# Patient Record
Sex: MALE | Race: BLACK OR AFRICAN AMERICAN | Employment: FULL TIME | URBAN - METROPOLITAN AREA
[De-identification: names, ages, dates, MRNs, and addresses within clinical notes are randomized per-mention and may not be internally consistent; named-entity substitution may affect disease eponyms.]

---

## 2018-02-21 ENCOUNTER — OFFICE VISIT (OUTPATIENT)
Dept: SURGERY | Age: 48
End: 2018-02-21

## 2018-02-21 VITALS
SYSTOLIC BLOOD PRESSURE: 112 MMHG | RESPIRATION RATE: 18 BRPM | BODY MASS INDEX: 26.15 KG/M2 | TEMPERATURE: 97.6 F | DIASTOLIC BLOOD PRESSURE: 88 MMHG | HEART RATE: 61 BPM | WEIGHT: 162 LBS

## 2018-02-21 DIAGNOSIS — K40.90 LEFT INGUINAL HERNIA: ICD-10-CM

## 2018-02-21 DIAGNOSIS — K40.90 LEFT INGUINAL HERNIA: Primary | ICD-10-CM

## 2018-02-21 RX ORDER — BISMUTH SUBSALICYLATE 262 MG
1 TABLET,CHEWABLE ORAL DAILY
COMMUNITY

## 2018-02-21 RX ORDER — SODIUM CHLORIDE 0.9 % (FLUSH) 0.9 %
5-10 SYRINGE (ML) INJECTION EVERY 8 HOURS
Status: CANCELLED | OUTPATIENT
Start: 2018-02-21

## 2018-02-21 RX ORDER — SODIUM CHLORIDE 0.9 % (FLUSH) 0.9 %
5-10 SYRINGE (ML) INJECTION AS NEEDED
Status: CANCELLED | OUTPATIENT
Start: 2018-02-21

## 2018-02-21 NOTE — PROGRESS NOTES
[facility]  General Surgery    Subjective:      HPI: Patient is a very pleasant 50-year-old male with a past medical history is remarkable for kidney stones. He is referred by Dr. Benja Munoz for evaluation and management of left inguinal hernia. The patient states that he first noticed the pain in his left groin approximately 2 years ago. Since that time the hernia has grown to now occupied most of his scrotum. He has never had a hernia in the past.  He has not had abdominal pelvic surgery in the past.  He is self-employed in the [address] for [employer] and other [employer]. 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 He will be between jobs for March. He would like to have the procedure done soon. He denies any difficulty with defecation or urination. There are no active problems to display for this patient. Past Medical History:   Diagnosis Date    Kidney stone       History reviewed. No pertinent surgical history. Family History   Problem Relation Age of Onset    Diabetes Mother       Social History   Substance Use Topics    Smoking status: Never Smoker    Smokeless tobacco: Never Used    Alcohol use No      No Known Allergies    Prior to Admission medications    Medication Sig Start Date End Date Taking? Authorizing Provider   multivitamin (ONE A DAY) tablet Take 1 Tab by mouth daily. Yes Historical Provider   promethazine (PHENERGAN) 25 mg tablet Take 25 mg by mouth every six (6) hours as needed for Nausea. Historical Provider   tamsulosin (FLOMAX) 0.4 mg capsule Take 0.4 mg by mouth daily. Historical Provider   oxyCODONE-acetaminophen (PERCOCET) 5-325 mg per tablet Take 1 Tab by mouth every four (4) hours as needed for Pain. 2/14/14   Ru Patel NP       Review of Systems:    14 systems were reviewed. The results are as above in the HPI and otherwise negative.      Objective:     Vitals: 02/21/18 0905   BP: 112/88   Pulse: 61   Resp: 18   Temp: 97.6 °F (36.4 °C)   Weight: 73.5 kg (162 lb)       Physical Exam:  GENERAL: alert, cooperative, no distress, appears stated age,   EYE: conjunctivae/corneas clear. PERRL, EOM's intact. THROAT & NECK: normal and no erythema or exudates noted. ,    LYMPHATIC: Cervical, supraclavicular, and axillary nodes normal. ,   LUNG: clear to auscultation bilaterally,   HEART: regular rate and rhythm, S1, S2 normal, no murmur, click, rub or gallop,   ABDOMEN: soft, non-tender. Bowel sounds normal. No masses,  no organomegaly, large left inguinal hernia which is chronically incarcerated in the scrotum partially reducible. No overlying skin changes. Minimal pain with attempts to manually reduce the hernia with the patient supine. EXTREMITIES:  extremities normal, atraumatic, no cyanosis or edema,   SKIN: Normal.,   NEUROLOGIC: AOx3. Cranial nerves 2-12 and sensation grossly intact. ,     Data Review:  to be done    Mr. Case Smith has a reminder for a \"due or due soon\" health maintenance. I have asked that he contact his primary care provider for follow-up on this health maintenance. Impression:     · Patient with a large, symptomatic left inguinal hernia.     Plan:     · Open left inguinal hernia repair with mesh  · Consent on chart  · Preoperative orders written    Signed By: Liana Cronin MD     February 21, 2018

## 2018-02-23 RX ORDER — OXYCODONE AND ACETAMINOPHEN 5; 325 MG/1; MG/1
TABLET ORAL
Status: ON HOLD | COMMUNITY
End: 2018-03-02 | Stop reason: CLARIF

## 2018-02-27 ENCOUNTER — HOSPITAL ENCOUNTER (OUTPATIENT)
Dept: LAB | Age: 48
Discharge: HOME | End: 2018-02-27

## 2018-02-27 ENCOUNTER — HOSPITAL ENCOUNTER (OUTPATIENT)
Dept: GENERAL RADIOLOGY | Age: 48
Discharge: HOME | End: 2018-02-27
Payer: COMMERCIAL

## 2018-02-27 DIAGNOSIS — K40.90 LEFT INGUINAL HERNIA: ICD-10-CM

## 2018-02-27 LAB
ABSOLUTE LYMPHOCYTE COUNT, 10803: 1.7 K/UL (ref 1–4.8)
ANION GAP SERPL CALC-SCNC: 13 MMOL/L
ATRIAL RATE: 62 BPM
BASOPHILS # BLD: 0 K/UL (ref 0–0.2)
BASOPHILS NFR BLD: 0 % (ref 0–2)
BUN SERPL-MCNC: 12 MG/DL (ref 6–22)
CALCIUM SERPL-MCNC: 9.1 MG/DL (ref 8.4–10.4)
CALCULATED P AXIS, ECG09: 63 DEGREES
CALCULATED R AXIS, ECG10: 64 DEGREES
CALCULATED T AXIS, ECG11: 44 DEGREES
CHLORIDE SERPL-SCNC: 101 MMOL/L (ref 98–110)
CO2 SERPL-SCNC: 27 MMOL/L (ref 20–32)
CREAT SERPL-MCNC: 1.1 MG/DL (ref 0.5–1.2)
DIAGNOSIS, 93000: NORMAL
EOSINOPHIL # BLD: 0.2 K/UL (ref 0–0.5)
EOSINOPHIL NFR BLD: 4 % (ref 0–6)
ERYTHROCYTE [DISTWIDTH] IN BLOOD BY AUTOMATED COUNT: 13.1 % (ref 10–16)
GFRAA, 66117: >60
GFRNA, 66118: >60
GLUCOSE SERPL-MCNC: 117 MG/DL (ref 70–99)
GRANULOCYTES,GRANS: 44 % (ref 40–75)
HCT VFR BLD AUTO: 44.4 % (ref 39.3–51.6)
HGB BLD-MCNC: 14.8 G/DL (ref 13.1–17.2)
LYMPHOCYTES, LYMLT: 40 % (ref 27–45)
MCH RBC QN AUTO: 30 PG (ref 26–34)
MCHC RBC AUTO-ENTMCNC: 33 G/DL (ref 32–36)
MCV RBC AUTO: 89 FL (ref 80–95)
MONOCYTES # BLD: 0.5 K/UL (ref 0.1–0.9)
MONOCYTES NFR BLD: 12 % (ref 3–9)
NEUTROPHILS # BLD AUTO: 1.9 K/UL (ref 1.8–7.7)
P-R INTERVAL, ECG05: 134 MS
PLATELET # BLD AUTO: 230 K/UL (ref 140–440)
PMV BLD AUTO: 10.3 FL (ref 6–10.8)
POTASSIUM SERPL-SCNC: 4 MMOL/L (ref 3.5–5.5)
Q-T INTERVAL, ECG07: 416 MS
QRS DURATION, ECG06: 88 MS
QTC CALCULATION (BEZET), ECG08: 422 MS
RBC # BLD AUTO: 4.98 M/UL (ref 3.8–5.8)
SENTARA SPECIMEN COL,SENBCF: NORMAL
SODIUM SERPL-SCNC: 141 MMOL/L (ref 133–145)
VENTRICULAR RATE, ECG03: 62 BPM
WBC # BLD AUTO: 4.3 K/UL (ref 4–11)

## 2018-02-27 PROCEDURE — 93005 ELECTROCARDIOGRAM TRACING: CPT

## 2018-02-27 PROCEDURE — 99001 SPECIMEN HANDLING PT-LAB: CPT | Performed by: SURGERY

## 2018-02-27 PROCEDURE — 71046 X-RAY EXAM CHEST 2 VIEWS: CPT

## 2018-03-01 ENCOUNTER — ANESTHESIA EVENT (OUTPATIENT)
Dept: SURGERY | Age: 48
End: 2018-03-01
Payer: COMMERCIAL

## 2018-03-02 ENCOUNTER — HOSPITAL ENCOUNTER (OUTPATIENT)
Age: 48
Setting detail: OUTPATIENT SURGERY
Discharge: HOME | End: 2018-03-02
Attending: SURGERY | Admitting: SURGERY
Payer: COMMERCIAL

## 2018-03-02 ENCOUNTER — ANESTHESIA (OUTPATIENT)
Dept: SURGERY | Age: 48
End: 2018-03-02
Payer: COMMERCIAL

## 2018-03-02 VITALS
OXYGEN SATURATION: 97 % | WEIGHT: 171.4 LBS | TEMPERATURE: 97.5 F | HEART RATE: 79 BPM | HEIGHT: 66 IN | SYSTOLIC BLOOD PRESSURE: 109 MMHG | DIASTOLIC BLOOD PRESSURE: 71 MMHG | BODY MASS INDEX: 27.55 KG/M2 | RESPIRATION RATE: 14 BRPM

## 2018-03-02 DIAGNOSIS — K40.90 LEFT INGUINAL HERNIA: ICD-10-CM

## 2018-03-02 DIAGNOSIS — G89.18 POSTOPERATIVE PAIN: Primary | ICD-10-CM

## 2018-03-02 PROCEDURE — 77030010507 HC ADH SKN DERMBND J&J -B: Performed by: SURGERY

## 2018-03-02 PROCEDURE — 74011000250 HC RX REV CODE- 250: Performed by: SURGERY

## 2018-03-02 PROCEDURE — 74011000250 HC RX REV CODE- 250

## 2018-03-02 PROCEDURE — 77030002996 HC SUT SLK J&J -A: Performed by: SURGERY

## 2018-03-02 PROCEDURE — 77030034850: Performed by: SURGERY

## 2018-03-02 PROCEDURE — 77030020782 HC GWN BAIR PAWS FLX 3M -B: Performed by: SURGERY

## 2018-03-02 PROCEDURE — 76210000022 HC REC RM PH II 1.5 TO 2 HR: Performed by: SURGERY

## 2018-03-02 PROCEDURE — 77030011640 HC PAD GRND REM COVD -A: Performed by: SURGERY

## 2018-03-02 PROCEDURE — 74011250636 HC RX REV CODE- 250/636: Performed by: NURSE ANESTHETIST, CERTIFIED REGISTERED

## 2018-03-02 PROCEDURE — 74011250636 HC RX REV CODE- 250/636: Performed by: SURGERY

## 2018-03-02 PROCEDURE — C1781 MESH (IMPLANTABLE): HCPCS | Performed by: SURGERY

## 2018-03-02 PROCEDURE — 77030012422 HC DRN WND COVD -A: Performed by: SURGERY

## 2018-03-02 PROCEDURE — 76010000153 HC OR TIME 1.5 TO 2 HR: Performed by: SURGERY

## 2018-03-02 PROCEDURE — 74011250637 HC RX REV CODE- 250/637: Performed by: SURGERY

## 2018-03-02 PROCEDURE — 77030002966 HC SUT PDS J&J -A: Performed by: SURGERY

## 2018-03-02 PROCEDURE — 99218 HC RM OBSERVATION: CPT

## 2018-03-02 PROCEDURE — 77030018836 HC SOL IRR NACL ICUM -A: Performed by: SURGERY

## 2018-03-02 PROCEDURE — 77030008683 HC TU ET CUF COVD -A: Performed by: ANESTHESIOLOGY

## 2018-03-02 PROCEDURE — 77030019605: Performed by: SURGERY

## 2018-03-02 PROCEDURE — 77030031139 HC SUT VCRL2 J&J -A: Performed by: SURGERY

## 2018-03-02 PROCEDURE — 88302 TISSUE EXAM BY PATHOLOGIST: CPT | Performed by: SURGERY

## 2018-03-02 PROCEDURE — 77030002986 HC SUT PROL J&J -A: Performed by: SURGERY

## 2018-03-02 PROCEDURE — 76210000006 HC OR PH I REC 0.5 TO 1 HR: Performed by: SURGERY

## 2018-03-02 PROCEDURE — 74011250637 HC RX REV CODE- 250/637: Performed by: NURSE ANESTHETIST, CERTIFIED REGISTERED

## 2018-03-02 PROCEDURE — 77030003028 HC SUT VCRL J&J -A: Performed by: SURGERY

## 2018-03-02 PROCEDURE — 74011250636 HC RX REV CODE- 250/636

## 2018-03-02 PROCEDURE — 77030032490 HC SLV COMPR SCD KNE COVD -B: Performed by: SURGERY

## 2018-03-02 PROCEDURE — 76060000034 HC ANESTHESIA 1.5 TO 2 HR: Performed by: SURGERY

## 2018-03-02 PROCEDURE — 77030002933 HC SUT MCRYL J&J -A: Performed by: SURGERY

## 2018-03-02 PROCEDURE — 77030026438 HC STYL ET INTUB CARD -A: Performed by: ANESTHESIOLOGY

## 2018-03-02 DEVICE — MESH HERN W10XL15CM POLY POLYLACTIC ACID 70% CLLGN 30% GLYC: Type: IMPLANTABLE DEVICE | Site: INGUINAL | Status: FUNCTIONAL

## 2018-03-02 DEVICE — MESH SURG W4XL6IN ELLIPSE SEPRA TECHNOLOGY VENTRALIGHT: Type: IMPLANTABLE DEVICE | Site: INGUINAL | Status: FUNCTIONAL

## 2018-03-02 RX ORDER — LIDOCAINE HYDROCHLORIDE 10 MG/ML
0.1 INJECTION, SOLUTION EPIDURAL; INFILTRATION; INTRACAUDAL; PERINEURAL AS NEEDED
Status: DISCONTINUED | OUTPATIENT
Start: 2018-03-02 | End: 2018-03-02 | Stop reason: HOSPADM

## 2018-03-02 RX ORDER — SODIUM CHLORIDE, SODIUM LACTATE, POTASSIUM CHLORIDE, CALCIUM CHLORIDE 600; 310; 30; 20 MG/100ML; MG/100ML; MG/100ML; MG/100ML
75 INJECTION, SOLUTION INTRAVENOUS CONTINUOUS
Status: DISCONTINUED | OUTPATIENT
Start: 2018-03-02 | End: 2018-03-03 | Stop reason: HOSPADM

## 2018-03-02 RX ORDER — SODIUM CHLORIDE 0.9 % (FLUSH) 0.9 %
5-10 SYRINGE (ML) INJECTION EVERY 8 HOURS
Status: DISCONTINUED | OUTPATIENT
Start: 2018-03-02 | End: 2018-03-02 | Stop reason: HOSPADM

## 2018-03-02 RX ORDER — NALOXONE HYDROCHLORIDE 0.4 MG/ML
0.2 INJECTION, SOLUTION INTRAMUSCULAR; INTRAVENOUS; SUBCUTANEOUS AS NEEDED
Status: DISCONTINUED | OUTPATIENT
Start: 2018-03-02 | End: 2018-03-03 | Stop reason: HOSPADM

## 2018-03-02 RX ORDER — HYDROMORPHONE HYDROCHLORIDE 1 MG/ML
0.5 INJECTION, SOLUTION INTRAMUSCULAR; INTRAVENOUS; SUBCUTANEOUS
Status: DISCONTINUED | OUTPATIENT
Start: 2018-03-02 | End: 2018-03-03 | Stop reason: HOSPADM

## 2018-03-02 RX ORDER — ONDANSETRON 2 MG/ML
4 INJECTION INTRAMUSCULAR; INTRAVENOUS ONCE
Status: DISCONTINUED | OUTPATIENT
Start: 2018-03-02 | End: 2018-03-03 | Stop reason: HOSPADM

## 2018-03-02 RX ORDER — FENTANYL CITRATE 50 UG/ML
INJECTION, SOLUTION INTRAMUSCULAR; INTRAVENOUS AS NEEDED
Status: DISCONTINUED | OUTPATIENT
Start: 2018-03-02 | End: 2018-03-02 | Stop reason: HOSPADM

## 2018-03-02 RX ORDER — CEFAZOLIN SODIUM 2 G/50ML
2 SOLUTION INTRAVENOUS ONCE
Status: COMPLETED | OUTPATIENT
Start: 2018-03-02 | End: 2018-03-02

## 2018-03-02 RX ORDER — PROPOFOL 10 MG/ML
INJECTION, EMULSION INTRAVENOUS AS NEEDED
Status: DISCONTINUED | OUTPATIENT
Start: 2018-03-02 | End: 2018-03-02 | Stop reason: HOSPADM

## 2018-03-02 RX ORDER — INSULIN LISPRO 100 [IU]/ML
INJECTION, SOLUTION INTRAVENOUS; SUBCUTANEOUS ONCE
Status: DISCONTINUED | OUTPATIENT
Start: 2018-03-02 | End: 2018-03-03 | Stop reason: HOSPADM

## 2018-03-02 RX ORDER — SODIUM CHLORIDE 0.9 % (FLUSH) 0.9 %
5-10 SYRINGE (ML) INJECTION AS NEEDED
Status: DISCONTINUED | OUTPATIENT
Start: 2018-03-02 | End: 2018-03-03 | Stop reason: HOSPADM

## 2018-03-02 RX ORDER — KETOROLAC TROMETHAMINE 30 MG/ML
INJECTION, SOLUTION INTRAMUSCULAR; INTRAVENOUS AS NEEDED
Status: DISCONTINUED | OUTPATIENT
Start: 2018-03-02 | End: 2018-03-02 | Stop reason: HOSPADM

## 2018-03-02 RX ORDER — SODIUM CHLORIDE 0.9 % (FLUSH) 0.9 %
5-10 SYRINGE (ML) INJECTION AS NEEDED
Status: DISCONTINUED | OUTPATIENT
Start: 2018-03-02 | End: 2018-03-02 | Stop reason: HOSPADM

## 2018-03-02 RX ORDER — GLYCOPYRROLATE 0.2 MG/ML
INJECTION INTRAMUSCULAR; INTRAVENOUS AS NEEDED
Status: DISCONTINUED | OUTPATIENT
Start: 2018-03-02 | End: 2018-03-02 | Stop reason: HOSPADM

## 2018-03-02 RX ORDER — DEXAMETHASONE SODIUM PHOSPHATE 4 MG/ML
INJECTION, SOLUTION INTRA-ARTICULAR; INTRALESIONAL; INTRAMUSCULAR; INTRAVENOUS; SOFT TISSUE AS NEEDED
Status: DISCONTINUED | OUTPATIENT
Start: 2018-03-02 | End: 2018-03-02 | Stop reason: HOSPADM

## 2018-03-02 RX ORDER — OXYCODONE AND ACETAMINOPHEN 5; 325 MG/1; MG/1
1-2 TABLET ORAL
Qty: 40 TAB | Refills: 0 | Status: SHIPPED | OUTPATIENT
Start: 2018-03-02 | End: 2018-04-18 | Stop reason: ALTCHOICE

## 2018-03-02 RX ORDER — OXYCODONE AND ACETAMINOPHEN 5; 325 MG/1; MG/1
2 TABLET ORAL
Status: COMPLETED | OUTPATIENT
Start: 2018-03-02 | End: 2018-03-02

## 2018-03-02 RX ORDER — MIDAZOLAM HYDROCHLORIDE 1 MG/ML
INJECTION, SOLUTION INTRAMUSCULAR; INTRAVENOUS AS NEEDED
Status: DISCONTINUED | OUTPATIENT
Start: 2018-03-02 | End: 2018-03-02 | Stop reason: HOSPADM

## 2018-03-02 RX ORDER — ROCURONIUM BROMIDE 10 MG/ML
INJECTION, SOLUTION INTRAVENOUS AS NEEDED
Status: DISCONTINUED | OUTPATIENT
Start: 2018-03-02 | End: 2018-03-02 | Stop reason: HOSPADM

## 2018-03-02 RX ORDER — FAMOTIDINE 20 MG/1
20 TABLET, FILM COATED ORAL ONCE
Status: COMPLETED | OUTPATIENT
Start: 2018-03-02 | End: 2018-03-02

## 2018-03-02 RX ORDER — IBUPROFEN 800 MG/1
800 TABLET ORAL
Qty: 40 TAB | Refills: 0 | Status: SHIPPED | OUTPATIENT
Start: 2018-03-02 | End: 2018-03-20 | Stop reason: SDUPTHER

## 2018-03-02 RX ORDER — SODIUM CHLORIDE, SODIUM LACTATE, POTASSIUM CHLORIDE, CALCIUM CHLORIDE 600; 310; 30; 20 MG/100ML; MG/100ML; MG/100ML; MG/100ML
50 INJECTION, SOLUTION INTRAVENOUS CONTINUOUS
Status: DISCONTINUED | OUTPATIENT
Start: 2018-03-02 | End: 2018-03-02 | Stop reason: HOSPADM

## 2018-03-02 RX ORDER — BUPIVACAINE HYDROCHLORIDE 2.5 MG/ML
INJECTION, SOLUTION EPIDURAL; INFILTRATION; INTRACAUDAL AS NEEDED
Status: DISCONTINUED | OUTPATIENT
Start: 2018-03-02 | End: 2018-03-02 | Stop reason: HOSPADM

## 2018-03-02 RX ORDER — LIDOCAINE HYDROCHLORIDE 20 MG/ML
INJECTION, SOLUTION EPIDURAL; INFILTRATION; INTRACAUDAL; PERINEURAL AS NEEDED
Status: DISCONTINUED | OUTPATIENT
Start: 2018-03-02 | End: 2018-03-02 | Stop reason: HOSPADM

## 2018-03-02 RX ORDER — MAGNESIUM SULFATE 100 %
4 CRYSTALS MISCELLANEOUS AS NEEDED
Status: DISCONTINUED | OUTPATIENT
Start: 2018-03-02 | End: 2018-03-03 | Stop reason: HOSPADM

## 2018-03-02 RX ORDER — DOCUSATE SODIUM 100 MG/1
100 CAPSULE, LIQUID FILLED ORAL 2 TIMES DAILY
Qty: 60 CAP | Refills: 2 | Status: SHIPPED | OUTPATIENT
Start: 2018-03-02 | End: 2018-05-31

## 2018-03-02 RX ORDER — DEXTROSE 50 % IN WATER (D50W) INTRAVENOUS SYRINGE
25-50 AS NEEDED
Status: DISCONTINUED | OUTPATIENT
Start: 2018-03-02 | End: 2018-03-03 | Stop reason: HOSPADM

## 2018-03-02 RX ORDER — PHENYLEPHRINE HCL IN 0.9% NACL 1 MG/10 ML
SYRINGE (ML) INTRAVENOUS AS NEEDED
Status: DISCONTINUED | OUTPATIENT
Start: 2018-03-02 | End: 2018-03-02 | Stop reason: HOSPADM

## 2018-03-02 RX ORDER — ONDANSETRON 2 MG/ML
INJECTION INTRAMUSCULAR; INTRAVENOUS AS NEEDED
Status: DISCONTINUED | OUTPATIENT
Start: 2018-03-02 | End: 2018-03-02 | Stop reason: HOSPADM

## 2018-03-02 RX ORDER — HYDROMORPHONE HYDROCHLORIDE 2 MG/ML
INJECTION, SOLUTION INTRAMUSCULAR; INTRAVENOUS; SUBCUTANEOUS AS NEEDED
Status: DISCONTINUED | OUTPATIENT
Start: 2018-03-02 | End: 2018-03-02 | Stop reason: HOSPADM

## 2018-03-02 RX ADMIN — FAMOTIDINE 20 MG: 20 TABLET, FILM COATED ORAL at 11:50

## 2018-03-02 RX ADMIN — KETOROLAC TROMETHAMINE 30 MG: 30 INJECTION, SOLUTION INTRAMUSCULAR; INTRAVENOUS at 19:35

## 2018-03-02 RX ADMIN — GLYCOPYRROLATE 0.2 MG: 0.2 INJECTION INTRAMUSCULAR; INTRAVENOUS at 18:07

## 2018-03-02 RX ADMIN — DEXAMETHASONE SODIUM PHOSPHATE 4 MG: 4 INJECTION, SOLUTION INTRA-ARTICULAR; INTRALESIONAL; INTRAMUSCULAR; INTRAVENOUS; SOFT TISSUE at 18:40

## 2018-03-02 RX ADMIN — SODIUM CHLORIDE, SODIUM LACTATE, POTASSIUM CHLORIDE, AND CALCIUM CHLORIDE: 600; 310; 30; 20 INJECTION, SOLUTION INTRAVENOUS at 19:26

## 2018-03-02 RX ADMIN — FENTANYL CITRATE 50 MCG: 50 INJECTION, SOLUTION INTRAMUSCULAR; INTRAVENOUS at 18:20

## 2018-03-02 RX ADMIN — SODIUM CHLORIDE, SODIUM LACTATE, POTASSIUM CHLORIDE, AND CALCIUM CHLORIDE 50 ML/HR: 600; 310; 30; 20 INJECTION, SOLUTION INTRAVENOUS at 11:50

## 2018-03-02 RX ADMIN — MIDAZOLAM HYDROCHLORIDE 2 MG: 1 INJECTION, SOLUTION INTRAMUSCULAR; INTRAVENOUS at 18:07

## 2018-03-02 RX ADMIN — LIDOCAINE HYDROCHLORIDE 80 MG: 20 INJECTION, SOLUTION EPIDURAL; INFILTRATION; INTRACAUDAL; PERINEURAL at 18:14

## 2018-03-02 RX ADMIN — FENTANYL CITRATE 50 MCG: 50 INJECTION, SOLUTION INTRAMUSCULAR; INTRAVENOUS at 18:13

## 2018-03-02 RX ADMIN — Medication 100 MCG: at 19:13

## 2018-03-02 RX ADMIN — HYDROMORPHONE HYDROCHLORIDE 1 MG: 2 INJECTION, SOLUTION INTRAMUSCULAR; INTRAVENOUS; SUBCUTANEOUS at 18:21

## 2018-03-02 RX ADMIN — PROPOFOL 160 MG: 10 INJECTION, EMULSION INTRAVENOUS at 18:14

## 2018-03-02 RX ADMIN — OXYCODONE HYDROCHLORIDE AND ACETAMINOPHEN 2 TABLET: 5; 325 TABLET ORAL at 21:48

## 2018-03-02 RX ADMIN — ROCURONIUM BROMIDE 5 MG: 10 INJECTION, SOLUTION INTRAVENOUS at 18:13

## 2018-03-02 RX ADMIN — CEFAZOLIN SODIUM 2 G: 2 SOLUTION INTRAVENOUS at 18:07

## 2018-03-02 RX ADMIN — ROCURONIUM BROMIDE 20 MG: 10 INJECTION, SOLUTION INTRAVENOUS at 18:27

## 2018-03-02 RX ADMIN — ONDANSETRON 4 MG: 2 INJECTION INTRAMUSCULAR; INTRAVENOUS at 18:07

## 2018-03-02 NOTE — H&P (VIEW-ONLY)
[facility]  General Surgery    Subjective:      HPI: Patient is a very pleasant 70-year-old male with a past medical history is remarkable for kidney stones. He is referred by Dr. Lucho Villegas for evaluation and management of left inguinal hernia. The patient states that he first noticed the pain in his left groin approximately 2 years ago. Since that time the hernia has grown to now occupied most of his scrotum. He has never had a hernia in the past.  He has not had abdominal pelvic surgery in the past.  He is self-employed in the [employer] for [employer] and other [employer]. 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 He will be between jobs for March. He would like to have the procedure done soon. He denies any difficulty with defecation or urination. There are no active problems to display for this patient. Past Medical History:   Diagnosis Date    Kidney stone       History reviewed. No pertinent surgical history. Family History   Problem Relation Age of Onset    Diabetes Mother       Social History   Substance Use Topics    Smoking status: Never Smoker    Smokeless tobacco: Never Used    Alcohol use No      No Known Allergies    Prior to Admission medications    Medication Sig Start Date End Date Taking? Authorizing Provider   multivitamin (ONE A DAY) tablet Take 1 Tab by mouth daily. Yes Historical Provider   promethazine (PHENERGAN) 25 mg tablet Take 25 mg by mouth every six (6) hours as needed for Nausea. Historical Provider   tamsulosin (FLOMAX) 0.4 mg capsule Take 0.4 mg by mouth daily. Historical Provider   oxyCODONE-acetaminophen (PERCOCET) 5-325 mg per tablet Take 1 Tab by mouth every four (4) hours as needed for Pain. 2/14/14   Oj Neff NP       Review of Systems:    14 systems were reviewed. The results are as above in the HPI and otherwise negative.      Objective:     Vitals: 02/21/18 0905   BP: 112/88   Pulse: 61   Resp: 18   Temp: 97.6 °F (36.4 °C)   Weight: 73.5 kg (162 lb)       Physical Exam:  GENERAL: alert, cooperative, no distress, appears stated age,   EYE: conjunctivae/corneas clear. PERRL, EOM's intact. THROAT & NECK: normal and no erythema or exudates noted. ,    LYMPHATIC: Cervical, supraclavicular, and axillary nodes normal. ,   LUNG: clear to auscultation bilaterally,   HEART: regular rate and rhythm, S1, S2 normal, no murmur, click, rub or gallop,   ABDOMEN: soft, non-tender. Bowel sounds normal. No masses,  no organomegaly, large left inguinal hernia which is chronically incarcerated in the scrotum partially reducible. No overlying skin changes. Minimal pain with attempts to manually reduce the hernia with the patient supine. EXTREMITIES:  extremities normal, atraumatic, no cyanosis or edema,   SKIN: Normal.,   NEUROLOGIC: AOx3. Cranial nerves 2-12 and sensation grossly intact. ,     Data Review:  to be done    Mr. Paolo Tavarez has a reminder for a \"due or due soon\" health maintenance. I have asked that he contact his primary care provider for follow-up on this health maintenance. Impression:     · Patient with a large, symptomatic left inguinal hernia.     Plan:     · Open left inguinal hernia repair with mesh  · Consent on chart  · Preoperative orders written    Signed By: Justyna Scherer MD     February 21, 2018

## 2018-03-02 NOTE — INTERVAL H&P NOTE
H&P Update:  Jose Eduardo Barajas was seen and examined. History and physical has been reviewed. The patient has been examined.  There have been no significant clinical changes since the completion of the originally dated History and Physical.    Signed By: Tram Bustos MD     March 2, 2018 6:05 PM

## 2018-03-02 NOTE — ANESTHESIA PREPROCEDURE EVALUATION
Anesthetic History   No history of anesthetic complications            Review of Systems / Medical History  Patient summary reviewed and pertinent labs reviewed    Pulmonary  Within defined limits                 Neuro/Psych   Within defined limits           Cardiovascular  Within defined limits                Exercise tolerance: >4 METS     GI/Hepatic/Renal  Within defined limits              Endo/Other  Within defined limits           Other Findings   Comments: Documentation of current medication  Current medications obtained, documented and obtained? YES      Risk Factors for Postoperative nausea/vomiting:       History of postoperative nausea/vomiting? NO       Female? NO       Motion sickness? NO       Intended opioid administration for postoperative analgesia? YES      Smoking Abstinence:  Current Smoker? NO  Elective Surgery? YES  Seen preoperatively by anesthesiologist or proxy prior to day of surgery? YES  Pt abstained from smoking 24 hours prior to anesthesia?  N/A    Preventive care/screening for High Blood Pressure:  Aged 18 years and older: YES  Screened for high blood pressure: YES  Patients with high blood pressure referred to primary care provider   for BP management: YES                 Physical Exam    Airway  Mallampati: III  TM Distance: 4 - 6 cm  Neck ROM: normal range of motion   Mouth opening: Normal     Cardiovascular  Regular rate and rhythm,  S1 and S2 normal,  no murmur, click, rub, or gallop  Rhythm: regular  Rate: normal         Dental  No notable dental hx       Pulmonary  Breath sounds clear to auscultation               Abdominal  GI exam deferred       Other Findings            Anesthetic Plan    ASA: 1  Anesthesia type: general          Induction: Intravenous  Anesthetic plan and risks discussed with: Patient

## 2018-03-02 NOTE — IP AVS SNAPSHOT
[address] [facility] [address] 
 
 
 [address] Patient: Paresh Baca MRN: [id number] About your hospitalization You were admitted on:  March 2, 2018 You last received care in the:  [facility] PHASE 2 RECOVERY You were discharged on:  March 2, 2018 Why you were hospitalized Your primary diagnosis was:  Not on File Your diagnoses also included:  Left Inguinal Hernia Follow-up Information Follow up With Details Comments Contact Info Annika Fox MD   [address] 2520 [facility] [address] 
[phone] Jacquelin Robertson MD Follow up in 2 week(s)  27 [facility] 240 200 [facility] [address] 
[phone] Your Scheduled Appointments Tuesday March 13, 2018  9:00 AM EDT  
[facility] ([facility]) 100 [facility] [address] [place] 240 200 [facility]  
[phone] Discharge Orders None A check rafaela indicates which time of day the medication should be taken. My Medications START taking these medications Instructions Each Dose to Equal  
 Morning Noon Evening Bedtime  
 docusate sodium 100 mg capsule Commonly known as:  Marggypsy Huger Your last dose was: Your next dose is: Take 1 Cap by mouth two (2) times a day for 90 days. 100 mg  
    
   
   
   
  
 ibuprofen 800 mg tablet Commonly known as:  MOTRIN Your last dose was: Your next dose is: Take 1 Tab by mouth three (3) times daily as needed for Pain. 800 mg  
    
   
   
   
  
 oxyCODONE-acetaminophen 5-325 mg per tablet Commonly known as:  PERCOCET Your last dose was: Your next dose is: Take 1-2 Tabs by mouth every four (4) hours as needed for Pain. Max Daily Amount: 12 Tabs. 1-2 Tab CONTINUE taking these medications Instructions Each Dose to Equal  
 Morning Noon Evening Bedtime  
 multivitamin tablet Commonly known as:  ONE A DAY Your last dose was: Your next dose is: Take 1 Tab by mouth daily. 1 Tab Where to Get Your Medications Information on where to get these meds will be given to you by the nurse or doctor. ! Ask your nurse or doctor about these medications  
  docusate sodium 100 mg capsule  
 ibuprofen 800 mg tablet  
 oxyCODONE-acetaminophen 5-325 mg per tablet Discharge Instructions [facility] Pepe Zhang MD, FACS General Surgery Pt may shower. Allow soap and water to run over the incision. Please apply an ice pack to the incision for 30 minutes three times daily for 7 days. Wear you most supportive underwear. No driving or operating heavy machinery while on narcotic pain medications. No strenuous activity or contact sports for two weeks. No lifting greater than 15 lbs for 2 weeks. Call MD for any redness, swelling, bleeding or pus at the incision. Also call for any nausea, vomiting, increased pain or pain uncontrolled by pain medicine. Hernia Repair: What to Expect at Home Your Recovery You are likely to have pain for the next few days. You may also feel like you have the flu, and you may have a low fever and feel tired and nauseated. This is common. You should feel better after a few days and will probably feel much better in 7 days. For several weeks you may feel twinges or pulling in the hernia repair when you move. You may have some bruising on the scrotum and along the penis. This is normal. Men will need to wear a jockstrap or briefs, not boxers, for scrotal support for several days after a groin (inguinal) hernia repair. ThinAir [employer] bicycle shorts may provide good support. This care sheet gives you a general idea about how long it will take for you to recover. But each person recovers at a different pace. Follow the steps below to get better as quickly as possible. How can you care for yourself at home? Activity ? · Rest when you feel tired. Getting enough sleep will help you recover. ? · Try to walk each day. Start by walking a little more than you did the day before. Bit by bit, increase the amount you walk. Walking boosts blood flow and helps prevent pneumonia and constipation. ? · Avoid strenuous activities, such as biking, jogging, weight lifting, or aerobic exercise, until your doctor says it is okay. ? · Avoid lifting anything that would make you strain. This may include heavy grocery bags and milk containers, a heavy briefcase or backpack, cat litter or dog food bags, a vacuum , or a child. ? · You may drive when you are no longer taking pain medicine and can quickly move your foot from the gas pedal to the brake. You must also be able to sit comfortably for a long period of time, even if you do not plan to go far. You might get caught in traffic. ? · Most people are able to return to work within 1 to 2 weeks after surgery. ? · You may shower 24 to 48 hours after surgery, if your doctor okays it. Pat the cut (incision) dry. Do not take a bath for the first 2 weeks, or until your doctor tells you it is okay. ? · Your doctor will tell you when you can have sex again. Diet ? · You can eat your normal diet. If your stomach is upset, try bland, low-fat foods like plain rice, broiled chicken, toast, and yogurt. ? · Drink plenty of fluids (unless your doctor tells you not to). ? · You may notice that your bowel movements are not regular right after your surgery. This is common. Avoid constipation and straining with bowel movements. You may want to take a fiber supplement every day.  If you have not had a bowel movement after a couple of days, ask your doctor about taking a mild laxative. Medicines ? · Your doctor will tell you if and when you can restart your medicines. He or she will also give you instructions about taking any new medicines. ? · If you take blood thinners, such as warfarin (Coumadin), clopidogrel (Plavix), or aspirin, be sure to talk to your doctor. He or she will tell you if and when to start taking those medicines again. Make sure that you understand exactly what your doctor wants you to do. ? · Be safe with medicines. Take pain medicines exactly as directed. ¨ If the doctor gave you a prescription medicine for pain, take it as prescribed. ¨ If you are not taking a prescription pain medicine, take an over-the-counter medicine such as acetaminophen (Tylenol), ibuprofen (Advil, Motrin), or naproxen (Aleve). Read and follow all instructions on the label. ¨ Do not take two or more pain medicines at the same time unless the doctor told you to. Many pain medicines have acetaminophen, which is Tylenol. Too much acetaminophen (Tylenol) can be harmful. ? · If your doctor prescribed antibiotics, take them as directed. Do not stop taking them just because you feel better. You need to take the full course of antibiotics. ? · If you think your pain medicine is making you sick to your stomach: 
¨ Take your medicine after meals (unless your doctor has told you not to). ¨ Ask your doctor for a different pain medicine. Incision care ? · If you have strips of tape on the cut (incision) the doctor made, leave the tape on for a week or until it falls off.  
? · If you have staples closing the cut, you will need to visit your doctor in 1 to 2 weeks to have them removed. ? · Wash the area daily with warm, soapy water and pat it dry. Follow-up care is a key part of your treatment and safety.  Be sure to make and go to all appointments, and call your doctor if you are having problems. It's also a good idea to know your test results and keep a list of the medicines you take. When should you call for help? Call 911 anytime you think you may need emergency care. For example, call if: 
? · You passed out (lost consciousness). ? · You are short of breath. ?Call your doctor now or seek immediate medical care if: 
? · You have pain that does not get better after you take pain medicine. ? · You are sick to your stomach and cannot keep fluids down. ? · You have signs of a blood clot in your leg (called a deep vein thrombosis), such as: 
¨ Pain in your calf, back of the knee, thigh, or groin. ¨ Redness and swelling in your leg or groin. ? · You cannot pass stools or gas. ? · Bright red blood has soaked through the bandage over your incision. ? · You have loose stitches, or your incision comes open. ? · You have signs of infection, such as: 
¨ Increased pain, swelling, warmth, or redness. ¨ Red streaks leading from the incision. ¨ Pus draining from the incision. ¨ A fever. ? Watch closely for any changes in your health, and be sure to contact your doctor if you have any problems. Where can you learn more? Go to http://james-edie.info/. Enter H423 in the search box to learn more about \"Hernia Repair: What to Expect at Home. \" Current as of: May 12, 2017 Content Version: 11.4 © 3276-3504 [employer]. Care instructions adapted under license by [employer] (which disclaims liability or warranty for this information). If you have questions about a medical condition or this instruction, always ask your healthcare professional. [place] [address] any warranty or liability for your use of this information. DISCHARGE SUMMARY from Nurse PATIENT INSTRUCTIONS: 
 
 
F-face looks uneven A-arms unable to move or move unevenly S-speech slurred or non-existent T-time-call 911 as soon as signs and symptoms begin-DO NOT go Back to bed or wait to see if you get better-TIME IS BRAIN. Warning Signs of HEART ATTACK Call 911 if you have these symptoms: 
? Chest discomfort. Most heart attacks involve discomfort in the center of the chest that lasts more than a few minutes, or that goes away and comes back. It can feel like uncomfortable pressure, squeezing, fullness, or pain. ? Discomfort in other areas of the upper body. Symptoms can include pain or discomfort in one or both arms, the back, neck, jaw, or stomach. ? Shortness of breath with or without chest discomfort. ? Other signs may include breaking out in a cold sweat, nausea, or lightheadedness. Don't wait more than five minutes to call [address]! Fast action can save your life. Calling 911 is almost always the fastest way to get lifesaving treatment. Emergency Medical Services staff can begin treatment when they arrive  up to an hour sooner than if someone gets to the hospital by car. The discharge information has been reviewed with the patient and spouse. The patient and spouse verbalized understanding. Discharge medications reviewed with the patient and spouse and appropriate educational materials and side effects teaching were provided. ___________________________________________________________________________________________________________________________________ [employer] Announcement We are excited to announce that we are making your provider's discharge notes available to you in [employer] (formerly [employer])t. You will see these notes when they are completed and signed by the physician that discharged you from your recent hospital stay. If you have any questions or concerns about any information you see in [employer], please call the Health Information Department where you were seen or reach out to your Primary Care Provider for more information about your plan of care. Providers Seen During Your Hospitalization Provider Specialty Primary office phone Tiara Rosy, [address] [facility] Surgery [phone] Your Primary Care Physician (PCP) Primary Care Physician Office Phone Office Fax Randee Hunt [phone] You are allergic to the following No active allergies Recent Documentation Height Weight BMI Smoking Status 1.676 m 77.7 kg 27.66 kg/m2 Never Smoker Emergency Contacts Name Discharge Info Relation Home Work Mobile [address] [facility] CAREGIVER [3] Spouse [3] [phone] Patient Belongings The following personal items are in your possession at time of discharge: 
  Dental Appliances: None  Visual Aid: None      Home Medications: None   Jewelry: None  Clothing: Jacket/Coat, Hat, Socks, Pants, Shirt, Footwear    Other Valuables: None Discharge Instructions Attachments/References OXYCODONE/ACETAMINOPHEN (BY MOUTH) (ENGLISH) IBUPROFEN (BY MOUTH) (ENGLISH) LAXATIVE, STIMULANT COMBINATION (BY MOUTH) (ENGLISH) Patient Handouts Oxycodone/Acetaminophen (By mouth) Acetaminophen (x-pkqp-b-MIN-oh-fen), Oxycodone Hydrochloride (vr-t-OEJ-done last-droe-KLOR-lavell) Treats moderate to moderately severe pain. This medicine is a narcotic pain reliever. Brand Name(s): Endocet, Percocet, Primlev, Xartemis XR There may be other brand names for this medicine. When This Medicine Should Not Be Used: This medicine is not right for everyone. Do not use it if you had an allergic reaction to acetaminophen or oxycodone, or if you have serious breathing problems or paralytic ileus. How to Use This Medicine:  
Capsule, Liquid, Tablet, Long Acting Tablet · Your doctor will tell you how much medicine to use. Do not use more than directed. · An overdose can be dangerous. Follow directions carefully so you do not get too much medicine at one time. · Oral liquid: Measure the oral liquid medicine with a marked measuring spoon, oral syringe, or medicine cup. · Swallow the extended-release tablet whole. Do not crush, break, or chew it. Do not lick or wet the tablet before placing it in your mouth. Do not give this medicine through a feeding tube. · This medicine should come with a Medication Guide. Ask your pharmacist for a copy if you do not have one. · Missed dose: If you miss a dose of this medicine, skip the missed dose and go back to your regular dosing schedule. Do not double doses. · Store the medicine in a closed container at room temperature, away from heat, moisture, and direct light. Ask your pharmacist about the best way to dispose of medicine you do not use. Drugs and Foods to Avoid: Ask your doctor or pharmacist before using any other medicine, including over-the-counter medicines, vitamins, and herbal products. · Do not use Xartemis XR if you are using or have used an MAO inhibitor in the past 14 days. · Some medicines can affect how this medicine works. Tell your doctor if you are using any of the following: ¨ Carbamazepine, erythromycin, ketoconazole, lamotrigine, mirtazapine, naltrexone, phenytoin, propranolol, rifampin, ritonavir, tramadol, trazodone, or zidovudine ¨ Birth control pills ¨ Diuretic (water pill) ¨ Medicine to treat depression ¨ Phenothiazine medicine ¨ Triptan medicine to treat migraine headaches · Do not drink alcohol while you are using this medicine. Acetaminophen can damage your liver, and alcohol can increase this risk. Do not take acetaminophen without asking your doctor if you have 3 or more drinks of alcohol every day. · Tell your doctor if you use anything else that makes you sleepy. Some examples are allergy medicine, narcotic pain medicine, and alcohol. Tell your doctor if you are using buprenorphine, butorphanol, nalbuphine, pentazocine, a benzodiazepine, or a muscle relaxer. Warnings While Using This Medicine: · Tell your doctor if you are pregnant or breastfeeding, or if you have kidney disease, liver disease, heart disease, low blood pressure, breathing problems or lung disease (such as asthma, COPD), thyroid problems, [place] disease, pancreas or gallbladder problems, prostate problems, trouble urinating, or a stomach problems, or a history of head injury or brain damage, seizures, or alcohol or drug abuse. Tell your doctor if you are allergic to codeine. · This medicine may cause the following problems: 
¨ High risk of overdose, which can lead to death ¨ Respiratory depression (serious breathing problem that can be life-threatening) ¨ Liver problems ¨ Serious skin reactions ¨ Serotonin syndrome (when used with certain medicines) · This medicine may make you dizzy or drowsy. Do not drive or do anything that could be dangerous until you know how this medicine affects you. Sit or lie down if you feel dizzy. Stand up carefully. · This medicine contains acetaminophen. Read the labels of all other medicines you are using to see if they also contain acetaminophen, or ask your doctor or pharmacist. Jacquelin Romero not use more than 4 grams (4,000 milligrams) total of acetaminophen in one day. · This medicine can be habit-forming. Do not use more than your prescribed dose. Call your doctor if you think your medicine is not working. · Do not stop using this medicine suddenly. Your doctor will need to slowly decrease your dose before you stop it completely. · This medicine could cause infertility. Talk with your doctor before using this medicine if you plan to have children. · This medicine may cause constipation, especially with long-term use. Ask your doctor if you should use a laxative to prevent and treat constipation. · Keep all medicine out of the reach of children. Never share your medicine with anyone. Possible Side Effects While Using This Medicine: Call your doctor right away if you notice any of these side effects: · Allergic reaction: Itching or hives, swelling in your face or hands, swelling or tingling in your mouth or throat, chest tightness, trouble breathing · Anxiety, restlessness, fast heartbeat, fever, muscle spasms, twitching, diarrhea, seeing or hearing things that are not there · Blistering, peeling, red skin rash · Blue lips, fingernails, or skin · Dark urine or pale stools, loss of appetite, stomach pain, yellow skin or eyes · Extreme weakness, shallow breathing, uneven heartbeat, seizures, sweating, or cold or clammy skin · Severe confusion, lightheadedness, dizziness, or fainting · Severe constipation, nausea, or vomiting · Trouble breathing or slow breathing If you notice these less serious side effects, talk with your doctor:  
· Headache · Mild constipation, nausea, or vomiting · Mild sleepiness or drowsiness If you notice other side effects that you think are caused by this medicine, tell your doctor. Call your doctor for medical advice about side effects. You may report side effects to FDA at 7-512-FDA-6608 © 2017 [address]  Information is for End User's use only and may not be sold, redistributed or otherwise used for commercial purposes. The above information is an  only. It is not intended as medical advice for individual conditions or treatments. Talk to your doctor, nurse or pharmacist before following any medical regimen to see if it is safe and effective for you. Ibuprofen (By mouth) Ibuprofen (eye-bue-PROE-fen) Treats pain and fever. This medicine is an NSAID. Brand Name(s):  Advil, Advil Children's, Advil Liqui-Gels, Advil Migraine, All-Purpose First Aid Kit, Children's Ibuprofen, Children's Motrin, Comfort Pac, Concentrated Motrin Infants' Drops, Equate Ibuprofen Jonathan Strength, Genpril, Good Neighbor Ibuprofen Infants', [facility] Children's Ibuprofen, [facility] Ibuprofen, [facility] Ibuprofen Jonathan Strength There may be other brand names for this medicine. When This Medicine Should Not Be Used: This medicine is not right for everyone. Do not use if you had an allergic reaction (including asthma) to ibuprofen, aspirin, or another NSAID, or right before or after heart surgery. How to Use This Medicine:  
Capsule, Liquid Filled Capsule, Suspension, Tablet, Chewable Tablet · Your doctor will tell you how much medicine to use. Do not use more than directed. · Prescription ibuprofen should come with a Medication Guide. Ask your pharmacist for the Medication Guide if you do not have one. · Follow the instructions on the medicine label if you are using this medicine without a prescription. · Take this medicine with food or milk if it upsets your stomach. · Oral liquid: Shake well just before using. Measure with a marked measuring spoon, oral syringe, or medicine cup. · Chewable tablet: Chew completely before you swallow it. Then drink some water to make sure you swallow all of the medicine. · For Children: Ask your pharmacist if you are not sure how much medicine to give a child. The dose is usually based on weight, not age. Never give more medicine than directed. · For Adults: Do not take more than 6 pills in 1 day (24 hours) unless your doctor tells you to. · Missed dose: If you take this medicine on a regular basis and miss a dose, take it as soon as you can. If it is almost time for your next dose, wait until then to use the medicine and skip the missed dose. Do not use extra medicine to make up for a missed dose. · Store the medicine in a closed container at room temperature, away from heat, moisture, and direct light. Do not freeze the oral liquid. Drugs and Foods to Avoid: Ask your doctor or pharmacist before using any other medicine, including over-the-counter medicines, vitamins, and herbal products. · Some foods and medicine can affect how ibuprofen works. Tell your doctor if you are also using lithium, methotrexate, a blood thinner (such as warfarin), a steroid medicine (such as hydrocortisone, prednisolone, prednisone), a diuretic (water pill), or an ACE inhibitor blood pressure medicine. · Do not use any other NSAID medicine unless your doctor says it is okay. Some other NSAIDs are aspirin, diclofenac, naproxen, or celecoxib. · Do not drink alcohol while you are using this medicine. Warnings While Using This Medicine: · Tell your doctor if you are pregnant or breastfeeding. Do not use this medicine during the later part of pregnancy. · Tell your doctor if you have kidney disease, liver disease, asthma, lupus or a similar connective tissue disease, or a history of ulcers or other digestion problems. Tell your doctor if you smoke or have heart or blood circulation problems, including high blood pressure, heart failure (CHF), or bleeding problems. · This medicine may cause the following problems: ¨ Bleeding and ulcers in the stomach or intestines ¨ Higher risk of heart attack or stroke ¨ Liver damage ¨ Kidney damage ¨ Vision problems · Call your doctor if symptoms get worse, pain lasts more than 10 days, or fever lasts more than 3 days. · This medicine might contain sugar or phenylalanine (aspartame). · Tell any doctor or dentist who treats you that you are using this medicine. · Keep all medicine out of the reach of children. Never share your medicine with anyone. Possible Side Effects While Using This Medicine:  
Call your doctor right away if you notice any of these side effects: · Allergic reaction: Itching or hives, swelling in your face or hands, swelling or tingling in your mouth or throat, chest tightness, trouble breathing · Blistering, peeling, or red skin rash · Change in how much or how often you urinate · Chest pain that may spread to your arms, jaw, back, or neck, trouble breathing, nausea, unusual sweating, faintness · Chest pain, trouble breathing, weakness on one side of your body, severe headache, trouble seeing or talking, pain in your lower leg · Dark urine or pale stools, nausea, vomiting, loss of appetite, stomach pain, yellow skin or eyes · Fever, neck pain, stiff neck · Severe stomach pain, vomiting blood, bloody or black, tarry stools · Swelling in your hands, ankles, or feet, rapid weight gain · Trouble seeing, blind spots, change in how you see colors · Unusual bleeding, bruising, or weakness If you notice these less serious side effects, talk with your doctor: · Constipation, diarrhea, gas, mild upset stomach · Dizziness, headache, ringing in the ears If you notice other side effects that you think are caused by this medicine, tell your doctor. Call your doctor for medical advice about side effects. You may report side effects to FDA at 6-421-FDA-7006 © 2017 [address]  Information is for End User's use only and may not be sold, redistributed or otherwise used for commercial purposes. The above information is an  only. It is not intended as medical advice for individual conditions or treatments. Talk to your doctor, nurse or pharmacist before following any medical regimen to see if it is safe and effective for you. Laxative, Stimulant Combination (By mouth) Treats constipation by helping you have a bowel movement. Brand Name(s): Colace, Doc-Q-Lax, Dok Plus, [facility] Stool Softener & Laxative, Good Sense Stimulant Laxative Plus, Laxacin, Medi-Laxx, Pao-Colace, [facility] Laxative and Stool Softener, [facility] P Col-Rite, Senexon-S, Senna-S, Senna-Time S, SennaLax-S, SennaPrompt There may be other brand names for this medicine. When This Medicine Should Not Be Used: You should not use this medicine if you have had an allergic reaction to senna, sennosides, docusate, casanthranol, or psyllium. Some brand names for these medicines are Correctol® stool softener, Ex-Lax®, Colace®, or Metamucil®. Make sure your doctor knows if you are allergic to any other laxative medicines. How to Use This Medicine:  
Tablet, Liquid Filled Capsule, Liquid, Granule, Capsule, Powder for Suspension · Your doctor will tell you how much medicine to use. Do not use more than directed. · If you have had a sudden change in your bowel movements in the past two weeks, ask your doctor before using this medicine. · Follow the instructions on the medicine label if you are using this medicine without a prescription. · Drink a full glass of water when you take this medicine. One full glass of water is about 8 ounces or 1 cup. Drinking 6 to 8 full glasses of water every day will help keep your bowel movements soft. · You might need to mix the granules or powder with water before you take each dose. Drink this mixture right away. Do not swallow the granules or powder dry unless the directions say you can. · Measure the oral liquid medicine with a marked measuring spoon, oral syringe, or medicine cup. · Use this medicine at bedtime, unless your doctor tells you otherwise. If a dose is missed: · Take a dose as soon as you remember. If it is almost time for your next dose, wait until then and take a regular dose. Do not take extra medicine to make up for a missed dose. How to Store and Dispose of This Medicine: · Store the medicine in a closed container at room temperature, away from heat, moisture, and direct light. · Ask your pharmacist, doctor, or health caregiver about the best way to dispose of any outdated medicine or medicine no longer needed. · Keep all medicine out of the reach of children. Never share your medicine with anyone. Drugs and Foods to Avoid: Ask your doctor or pharmacist before using any other medicine, including over-the-counter medicines, vitamins, and herbal products. · Make sure your doctor knows if you are also using mineral oil. · Some laxatives need to be taken 2 hours before or 2 hours after other medicines. If you need to take any other medicine, ask your health caregiver if you need to follow a special schedule. Warnings While Using This Medicine: · Make sure your doctor knows if you are pregnant or breast feeding. · Do not use this medicine if you have stomach pain, nausea, or vomiting, unless your health caregiver tells you to use it. · If you do not have a bowel movement after using this medicine, talk to your doctor. Most people will have a bowel movement within 6 to 12 hours after using this laxative. · You should not use this laxative for more than 1 week unless your doctor says it is okay. Laxatives may be habit-forming and can harm your bowels if you use them too long. Possible Side Effects While Using This Medicine:  
Call your doctor right away if you notice any of these side effects: · Bleeding from your rectum. · Skin rash. · Urine turns a different color. If you notice these less serious side effects, talk with your doctor: · Diarrhea, cramps, nausea, burping. If you notice other side effects that you think are caused by this medicine, tell your doctor. Call your doctor for medical advice about side effects. You may report side effects to FDA at 3-987-FDA-1409 © 2017 [address] Information is for End User's use only and may not be sold, redistributed or otherwise used for commercial purposes. The above information is an  only. It is not intended as medical advice for individual conditions or treatments. Talk to your doctor, nurse or pharmacist before following any medical regimen to see if it is safe and effective for you. Please provide this summary of care documentation to your next provider. Signatures-by signing, you are acknowledging that this After Visit Summary has been reviewed with you and you have received a copy. Patient Signature:  ____________________________________________________________ Date:  ____________________________________________________________  
  
Devota Dandy Provider Signature:  ____________________________________________________________ Date:  ____________________________________________________________

## 2018-03-03 NOTE — ANESTHESIA POSTPROCEDURE EVALUATION
Post-Anesthesia Evaluation and Assessment    Patient: Ruby Dailey MRN: [id number]  SSN: [id number]    YOB: 1970  Age: 49 y.o. Sex: male       Cardiovascular Function/Vital Signs  Visit Vitals    /74    Pulse 86    Temp 36.4 °C (97.6 °F)    Resp 14    Ht 5' 6\" (1.676 m)    Wt 77.7 kg (171 lb 6.4 oz)    SpO2 100%    BMI 27.66 kg/m2       Patient is status post general anesthesia for Procedure(s):  OPEN LEFT INGUINAL HERNIA REPAIR WITH MESH. Nausea/Vomiting: None    Postoperative hydration reviewed and adequate. Pain: 0/10    Managed    Neurological Status:   Neuro (WDL): Within Defined Limits (03/02/18 1119)   At baseline    Mental Status and Level of Consciousness: Alert    Pulmonary Status:   O2 Device: Oxygen mask (Simultaneous filing. User may not have seen previous data.) (03/02/18 2008)   Adequate oxygenation and airway patent    Complications related to anesthesia: None    Post-anesthesia assessment completed.  No concerns    Signed By: Nati Mcgrath CRNA     March 2, 2018

## 2018-03-03 NOTE — OP NOTES
[place] [address] [address] [facility]                                 OPERATIVE REPORT    Chong Calhoun  [id number]  [id number]  OR/PL  ATTENDING:   Daniel Carcamo MD        PREOPERATIVE DIAGNOSIS:  Large left inguinal hernia    POSTOPERATIVE DIAGNOSIS:  Large indirect left inguinal hernia    PROCEDURES PERFORMED:  Left inguinal hernia repair with  mesh. SURGEON:  Barrett Lee M.D.    ASSISTANT:  Berry Em SA    ANESTHESIA:  General, and local (0.25% Marcaine plain). ESTIMATED BLOOD LOSS:  20 mL. FLUIDS GIVEN:  1200 mL. IMPLANTS:  Progrip mesh    FINDINGS:  Large indirect inguinal hernia containing omentum. SPECIMENS REMOVED:  Omentum    OPERATIVE INDICATION:  Large symptomatic left inguinal hernia. DESCRIPTION OF PROCEDURE:  The patient was identified in the holding area   where consent for a open left inguinal hernia repair  with mesh was verified.  The clipper prep of the hair in the genital area  was performed. In the operating room, the patient was placed  under general anesthesia.  The excess hair was removed.  The lower abdomen and groin area were then prepped using iodine povidone scrub and paint.  The sterile  drapes were applied.  A time-out was performed to ensure the correct  procedure.  Local anesthetic was infiltrated into the skin and deep dermal  tissues at the right anterior superior iliac spine and in the proposed  incision site, overlapping the pubic tubercle.  The #15-blade was used to  create an incision through the skin and subcutaneous tissue.   Electrocautery was used for hemostasis and to dissect down through Arlen  fascia to expose the external oblique fascia.  Additional local anesthetic  was infiltrated into the inguinal canal through the external oblique  fascia.  The #15-blade was used to incise the fascia, and the Metzenbaums  were used to open up the fascia medially and laterally.  Hemostats were  placed on the edges of the fascia.  The cremasteric fibers were dissected away from the cord and sac with blunt dissection and cautery. The hernia was immediately obvious with  small bowel within a thin hernia sac, constituting an indirect hernia.  This  was dissected free using a combination of blunt dissection, sharp  dissection, and electrocautery.  The greater omentum was present in the hernia sac. The omentum was removed with ashely clamp and 0 Vicryl ligation. The Ventralite mesh was used to plug the defect  using interrupted stitches of #1 single stranded PDS.  The mesh was stitched to the internal oblique musculature in two places, the conjoined tendon pubic tubercle and the Spencer ligament, and the shelving edge of the inguinal ligament in two places.  The sutures were tied into  place.  The mesh set nicely within the defect. The Progrip mesh was then trimmed to fit as a patch in the defect. The patch was then placed to overlap the pubic  Tubercle. The tails of the patch were tucked laterally to  protect the lateral triangle.  The 2-0 Vicryl suture was used to  reapproximate the fascia of the external oblique musculature.  Irrigation  was performed.  No bleeding was noted.  The 3-0 Vicryl suture was used to  reapproximate the deep dermal tissues and Arlen's fascia.  A 4-0 Vicryl  suture was used to reapproximate the skin in a running subcuticular  closure.  Dermabond was used as a wound sealant.  The patient tolerated the  procedure very well. COMPLICATIONS:  None    DISPOSITION:  The patient was extubated and stable upon transport to the  recovery room.

## 2018-03-03 NOTE — DISCHARGE SUMMARY
[place] [address] [facility]  Tiara Gallegos MD, [facility]  General Surgery  Discharge Summary     Patient ID:  Zora Mota  [id number]  28 y.o.  1970    Admit Date: 3/2/2018    Discharge Date: 3/2/2018    Admission Diagnoses: Left inguinal hernia [K40.90]    Discharge Diagnoses:    Problem List as of 3/2/2018  Date Reviewed: 3/2/2018          Codes Class Noted - Resolved    Left inguinal hernia ICD-10-CM: K40.90  ICD-9-CM: 550.90  3/2/2018 - Present               Admission Condition: Good    Discharge Condition: Good    Last Procedure: Procedure(s):  OPEN LEFT INGUINAL HERNIA REPAIR WITH [address] Course:   Normal hospital course for this procedure. Consults: None    Significant Diagnostic Studies: None    Disposition: home    Patient Instructions:   Current Discharge Medication List      START taking these medications    Details   oxyCODONE-acetaminophen (PERCOCET) 5-325 mg per tablet Take 1-2 Tabs by mouth every four (4) hours as needed for Pain. Max Daily Amount: 12 Tabs. Qty: 40 Tab, Refills: 0    Associated Diagnoses: Postoperative pain      ibuprofen (MOTRIN) 800 mg tablet Take 1 Tab by mouth three (3) times daily as needed for Pain. Qty: 40 Tab, Refills: 0    Associated Diagnoses: Postoperative pain      docusate sodium (COLACE) 100 mg capsule Take 1 Cap by mouth two (2) times a day for 90 days. Qty: 60 Cap, Refills: 2    Associated Diagnoses: Postoperative pain         CONTINUE these medications which have NOT CHANGED    Details   multivitamin (ONE A DAY) tablet Take 1 Tab by mouth daily. Activity: See surgical instructions  Diet: Low fat, Low cholesterol  Wound Care: As directed    Follow-up with Dr. Manny Medley in 2 week.   Follow-up tests/labs None    Signed:  Tiara Gallegos MD  3/2/2018  8:01 PM

## 2018-03-03 NOTE — PERIOP NOTES
Discharge instructions provided to patient and spouse verbally and written with understanding verbalized. Ice pack provided with instructions. Patient discharged via wheelchair with safe assist to car with wife driving home.

## 2018-03-14 ENCOUNTER — OFFICE VISIT (OUTPATIENT)
Dept: SURGERY | Age: 48
End: 2018-03-14

## 2018-03-14 VITALS
SYSTOLIC BLOOD PRESSURE: 106 MMHG | DIASTOLIC BLOOD PRESSURE: 72 MMHG | OXYGEN SATURATION: 97 % | TEMPERATURE: 98.3 F | RESPIRATION RATE: 20 BRPM | HEART RATE: 88 BPM

## 2018-03-14 DIAGNOSIS — K59.03 CONSTIPATION DUE TO OPIOID THERAPY: ICD-10-CM

## 2018-03-14 DIAGNOSIS — Z09 POSTOPERATIVE EXAMINATION: Primary | ICD-10-CM

## 2018-03-14 DIAGNOSIS — K40.90 LEFT INGUINAL HERNIA: ICD-10-CM

## 2018-03-14 DIAGNOSIS — T40.2X5A CONSTIPATION DUE TO OPIOID THERAPY: ICD-10-CM

## 2018-03-14 DIAGNOSIS — N50.89 SCROTAL SWELLING: ICD-10-CM

## 2018-03-14 NOTE — PATIENT INSTRUCTIONS
Constipation: Care Instructions  Your Care Instructions    Constipation means that you have a hard time passing stools (bowel movements). People pass stools from 3 times a day to once every 3 days. What is normal for you may be different. Constipation may occur with pain in the rectum and cramping. The pain may get worse when you try to pass stools. Sometimes there are small amounts of bright red blood on toilet paper or the surface of stools. This is because of enlarged veins near the rectum (hemorrhoids). A few changes in your diet and lifestyle may help you avoid ongoing constipation. Your doctor may also prescribe medicine to help loosen your stool. Some medicines can cause constipation. These include pain medicines and antidepressants. Tell your doctor about all the medicines you take. Your doctor may want to make a medicine change to ease your symptoms. Follow-up care is a key part of your treatment and safety. Be sure to make and go to all appointments, and call your doctor if you are having problems. It's also a good idea to know your test results and keep a list of the medicines you take. How can you care for yourself at home? · Drink plenty of fluids, enough so that your urine is light yellow or clear like water. If you have kidney, heart, or liver disease and have to limit fluids, talk with your doctor before you increase the amount of fluids you drink. · Include high-fiber foods in your diet each day. These include fruits, vegetables, beans, and whole grains. · Get at least 30 minutes of exercise on most days of the week. Walking is a good choice. You also may want to do other activities, such as running, swimming, cycling, or playing tennis or team sports. · Take a fiber supplement, such as Citrucel or Metamucil, every day. Read and follow all instructions on the label. · Schedule time each day for a bowel movement. A daily routine may help.  Take your time having your bowel movement. · Support your feet with a small step stool when you sit on the toilet. This helps flex your hips and places your pelvis in a squatting position. · Your doctor may recommend an over-the-counter laxative to relieve your constipation. Examples are Milk of Magnesia and MiraLax. Read and follow all instructions on the label. Do not use laxatives on a long-term basis. When should you call for help? Call your doctor now or seek immediate medical care if:  ? · You have new or worse belly pain. ? · You have new or worse nausea or vomiting. ? · You have blood in your stools. ? Watch closely for changes in your health, and be sure to contact your doctor if:  ? · Your constipation is getting worse. ? · You do not get better as expected. Where can you learn more? Go to http://james-edie.info/. Enter [phone] in the search box to learn more about \"Constipation: Care Instructions. \"  Current as of: March 20, 2017  Content Version: 11.4  © 0399-6315 [employer]. Care instructions adapted under license by [employer] (which disclaims liability or warranty for this information). If you have questions about a medical condition or this instruction, always ask your healthcare professional. [address] any warranty or liability for your use of this information.

## 2018-03-14 NOTE — PROGRESS NOTES
Rogerio Damian. LORI Driver  PROGRESS NOTE    Name: Harvey Sher MRN: [id number]   : 1970 [facility]   Date: 3/14/2018 Admission Date: No admission date for patient encounter. Subjective:  Mr. Justin Arellano is a very pleasant 48-year-old AA male who presents not quite 2 weeks out from left inguinal hernia repair done on 2018. He appears to be extremely uncomfortable upon presentation and states that he is still in a significant amount of pain. He experienced some severe constipation postoperatively and subsequently stopped all pain medications including Motrin on Monday of this week. He states that he has had to use some sort of liquid medicine to move his bowels, but this has not been regular and he is very uncomfortable. He complains of having chills a few times during the past week, but denies any known fever or drainage from his incision. Objective:  Vitals:    18 1333   BP: 106/72   Pulse: 88   Resp: 20   Temp: 98.3 °F (36.8 °C)   SpO2: 97%        Physical Exam:   General:  Well developed well nourished male in no apparent distress   Left groin: Groin is soft with some tenderness. No masses, organomegaly or   Guarding. Incision is clean, dry, intact. Glue intact. No drainage. Left scrotum: Significant edema from incision down through left scrotum beginning to invade right side. Tender to palpation. No warmth, no erythema. Labs:  No results found for this or any previous visit (from the past 24 hour(s)). Current Medications:  Current Outpatient Prescriptions   Medication Sig Dispense Refill    ibuprofen (MOTRIN) 800 mg tablet Take 1 Tab by mouth three (3) times daily as needed for Pain. 40 Tab 0    multivitamin (ONE A DAY) tablet Take 1 Tab by mouth daily.  oxyCODONE-acetaminophen (PERCOCET) 5-325 mg per tablet Take 1-2 Tabs by mouth every four (4) hours as needed for Pain. Max Daily Amount: 12 Tabs.  40 Tab 0    docusate sodium (COLACE) 100 mg capsule Take 1 Cap by mouth two (2) times a day for 90 days. 60 Cap 2       Chart and notes reviewed. Data reviewed. I have evaluated and examined the patient. IMPRESSION:   · Patient not quite 2 weeks out from left inguinal hernia repair with postop constipation, postop pain, and significant edema to left groin and scrotum. PLAN:/DISCUSION:   · CT pelvis  · Motrin 800 mg every 8 hours as needed for pain  · Milk of magnesia 1 tablespoon in a.m., 1 tablespoon in p.m. until bowels move. Then decreased to 1 dose per day until regular. · Follow-up 2 weeks     Mr. Melanie Coulter has a reminder for a \"due or due soon\" health maintenance. I have asked that he contact his primary care provider for follow-up on this health maintenance. Virginia Mercer.  Amaya Stewart, KD-C

## 2018-03-20 DIAGNOSIS — G89.18 POSTOPERATIVE PAIN: ICD-10-CM

## 2018-03-20 RX ORDER — IBUPROFEN 800 MG/1
800 TABLET ORAL
Qty: 40 TAB | Refills: 0 | Status: SHIPPED | OUTPATIENT
Start: 2018-03-20 | End: 2018-04-18 | Stop reason: SDUPTHER

## 2018-03-27 ENCOUNTER — HOSPITAL ENCOUNTER (OUTPATIENT)
Dept: CT IMAGING | Age: 48
Discharge: HOME | End: 2018-03-27
Attending: NURSE PRACTITIONER
Payer: COMMERCIAL

## 2018-03-27 DIAGNOSIS — K40.90 LEFT INGUINAL HERNIA: ICD-10-CM

## 2018-03-27 DIAGNOSIS — N50.89 SCROTAL SWELLING: ICD-10-CM

## 2018-03-27 PROCEDURE — 72193 CT PELVIS W/DYE: CPT

## 2018-03-27 PROCEDURE — 74011636320 HC RX REV CODE- 636/320: Performed by: NURSE PRACTITIONER

## 2018-03-27 RX ADMIN — IOPAMIDOL 100 ML: 612 INJECTION, SOLUTION INTRAVENOUS at 09:19

## 2018-04-04 ENCOUNTER — OFFICE VISIT (OUTPATIENT)
Dept: SURGERY | Age: 48
End: 2018-04-04

## 2018-04-04 VITALS
HEIGHT: 66 IN | WEIGHT: 164 LBS | TEMPERATURE: 98.6 F | RESPIRATION RATE: 18 BRPM | SYSTOLIC BLOOD PRESSURE: 120 MMHG | OXYGEN SATURATION: 97 % | HEART RATE: 70 BPM | DIASTOLIC BLOOD PRESSURE: 80 MMHG | BODY MASS INDEX: 26.36 KG/M2

## 2018-04-04 DIAGNOSIS — K40.90 LEFT INGUINAL HERNIA: Primary | ICD-10-CM

## 2018-04-04 DIAGNOSIS — N50.89 SCROTAL SWELLING: ICD-10-CM

## 2018-04-04 NOTE — MR AVS SNAPSHOT
[address] [facility] 
[phone] Patient: Yonny Gutierrez MRN: [id number] CZS:[birth year] Visit Information Date & Time Provider Department Dept. Phone Encounter #  
 4/4/2018 10:00 AM Reese Fink MD [address] [id number] Your Appointments 4/18/2018  9:00 AM  
Follow Up with MD ABBY Flores [facility] Appt Note: 2 week follow up [address] [place] [facility] [address] [facility] [address] [facility] Upcoming Health Maintenance Date Due DTaP/Tdap/Td series (1 - Tdap) 7/4/1990 Influenza Age 5 to Adult 8/1/2017 Allergies as of 4/4/2018  Review Complete On: 4/4/2018 By: Pal Gomez LPN No Known Allergies Current Immunizations  Never Reviewed No immunizations on file. Not reviewed this visit Vitals BP Pulse Temp Resp Height(growth percentile) Weight(growth percentile) 120/80 70 98.6 °F (37 °C) (Oral) 18 5' 6\" (1.676 m) 164 lb (74.4 kg) SpO2 BMI Smoking Status 97% 26.47 kg/m2 Never Smoker Vitals History BMI and BSA Data Body Mass Index Body Surface Area  
 26.47 kg/m 2 1.86 m 2 Preferred Pharmacy Pharmacy Name Phone [id number] [place] [phone] Your Updated Medication List  
  
   
This list is accurate as of 4/4/18 10:57 AM.  Always use your most recent med list.  
  
  
  
  
 docusate sodium 100 mg capsule Commonly known as:  Grey Guppy Take 1 Cap by mouth two (2) times a day for 90 days. ibuprofen 800 mg tablet Commonly known as:  MOTRIN Take 1 Tab by mouth three (3) times daily as needed for Pain.  
  
 multivitamin tablet Commonly known as:  ONE A DAY  
 Take 1 Tab by mouth daily. oxyCODONE-acetaminophen 5-325 mg per tablet Commonly known as:  PERCOCET Take 1-2 Tabs by mouth every four (4) hours as needed for Pain. Max Daily Amount: 12 Tabs. Introducing [facility] SERVICES! Dear Kvng Flores: Thank you for requesting a [employer] account. Our records indicate that you already have an active [employer] account. You can access your account anytime at [email or web address] Did you know that you can access your hospital and ER discharge instructions at any time in [employer]? You can also review all of your test results from your hospital stay or ER visit. Additional Information If you have questions, please visit the Frequently Asked Questions section of the [employer] website at [email or web address] Remember, [employer] is NOT to be used for urgent needs. For medical emergencies, dial 911. Now available from your iPhone and Android! Please provide this summary of care documentation to your next provider. Your primary care clinician is listed as Yaneli Gomes. If you have any questions after today's visit, please call [phone].

## 2018-04-04 NOTE — PROGRESS NOTES
Barrett Bernstein M.D. FACS  PROGRESS NOTE    Name: Lowry Gilford MRN: [id number]   : 1970 [facility]   Date: 2018 Admission Date: No admission date for patient encounter. Subjective:  Pt presents today to follow-up the CT of the pelvis which was performed. I reviewed the CT of the pelvis independently on the monitor. I agree with the findings of seroma in the scrotum and inguinal canal.  No evidence of bowel or colon in the hernia. The patient denies any nausea vomiting with meals. He has bowel movements regularly daily. Objective:  Vitals:    18 1034   BP: 120/80   Pulse: 70   Resp: 18   Temp: 98.6 °F (37 °C)   TempSrc: Oral   SpO2: 97%   Weight: 74.4 kg (164 lb)   Height: 5' 6\" (1.676 m)            Physical Exam:    General: Awake and alert, oriented ×4, no apparent distress   Abdomen: abdomen is soft with minimal incisional tenderness at left groin. Incision(s) are C/D/I. Seroma present in the scrotum and inguinal canal.  No  masses, organomegaly or guarding    Labs:  No results found for this or any previous visit (from the past 24 hour(s)). All Micro Results     None          Current Medications:  Current Outpatient Prescriptions   Medication Sig Dispense Refill    ibuprofen (MOTRIN) 800 mg tablet Take 1 Tab by mouth three (3) times daily as needed for Pain. 40 Tab 0    oxyCODONE-acetaminophen (PERCOCET) 5-325 mg per tablet Take 1-2 Tabs by mouth every four (4) hours as needed for Pain. Max Daily Amount: 12 Tabs. 40 Tab 0    multivitamin (ONE A DAY) tablet Take 1 Tab by mouth daily.  docusate sodium (COLACE) 100 mg capsule Take 1 Cap by mouth two (2) times a day for 90 days. 60 Cap 2       Chart and notes reviewed. Data reviewed. I have evaluated and examined the patient. IMPRESSION:   · Patient doing well 1 month out from open left inguinal hernia repair with mesh with seroma in the scrotum is large hernia repair with a scrotal hernia. PLAN:/DISCUSION:   · Patient may begin to lift between 25 and 50 pounds. · He can drive. · He can mow the yard and trim the edges. · He can climb a ladder  · Follow-up in 2 weeks for release to full duty.         Kita Hodgkin, MD

## 2018-04-18 ENCOUNTER — OFFICE VISIT (OUTPATIENT)
Dept: SURGERY | Age: 48
End: 2018-04-18

## 2018-04-18 VITALS
WEIGHT: 167 LBS | HEIGHT: 66 IN | BODY MASS INDEX: 26.84 KG/M2 | TEMPERATURE: 98 F | OXYGEN SATURATION: 99 % | HEART RATE: 78 BPM | RESPIRATION RATE: 16 BRPM | SYSTOLIC BLOOD PRESSURE: 122 MMHG | DIASTOLIC BLOOD PRESSURE: 80 MMHG

## 2018-04-18 DIAGNOSIS — G89.18 POSTOPERATIVE PAIN: ICD-10-CM

## 2018-04-18 DIAGNOSIS — Z09 POSTOPERATIVE EXAMINATION: Primary | ICD-10-CM

## 2018-04-18 RX ORDER — IBUPROFEN 800 MG/1
800 TABLET ORAL
Qty: 40 TAB | Refills: 0 | Status: SHIPPED | OUTPATIENT
Start: 2018-04-18

## 2018-04-18 NOTE — PROGRESS NOTES
Vidhya Graves. LORI Morgan  PROGRESS NOTE    Name: Lance Fuentes MRN: [id number]   : 1970 Hospital: [facility]   Date: 2018 Admission Date: No admission date for patient encounter. Subjective:  Mr. Jody Todd is a very pleasant 14-year-old AA male who presents a little over 6 weeks out from left inguinal hernia repair done on 2018. Though he still has some remaining scrotal edema, he reports that it feels much better. He still reports some minor soreness in his lower abdomen closer to his previous surgical site which he controls with 100 mg Motrin. He states he takes about 1 daily which is working well. He states he only has one left and he is requesting a refill. He is eager to return to work. 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 We will release him to full duty, but I did advise him to gradually resume his previous level of activity. He does have some remaining soreness and edema, we will touch base in about a month just to ensure things are on the proper healing tract. Objective:  Vitals:    18 0917   BP: 122/80   Pulse: 78   Resp: 16   Temp: 98 °F (36.7 °C)   TempSrc: Oral   SpO2: 99%   Weight: 75.8 kg (167 lb)   Height: 5' 6\" (1.676 m)        Physical Exam:   General:  Well developed well nourished male in no apparent distress   Abdomen: abdomen is soft with minimal tenderness. No masses, organomegaly or   Guarding. Incision is well-healed. Labs:  No results found for this or any previous visit (from the past 24 hour(s)). Current Medications:  Current Outpatient Prescriptions   Medication Sig Dispense Refill    ibuprofen (MOTRIN) 800 mg tablet Take 1 Tab by mouth three (3) times daily as needed for Pain. 40 Tab 0    multivitamin (ONE A DAY) tablet Take 1 Tab by mouth daily.       oxyCODONE-acetaminophen (PERCOCET) 5-325 mg per tablet Take 1-2 Tabs by mouth every four (4) hours as needed for Pain. Max Daily Amount: 12 Tabs. 40 Tab 0    docusate sodium (COLACE) 100 mg capsule Take 1 Cap by mouth two (2) times a day for 90 days. 60 Cap 2       Chart and notes reviewed. Data reviewed. I have evaluated and examined the patient. IMPRESSION:   · Patient a little over 6 weeks out from left inguinal hernia repair doing well overall, but with some residual edema and soreness. PLAN:/DISCUSION:   · Return to work full duty  · 800 mg Motrin daily as needed for discomfort  · Ice to groin and scrotum for swelling as needed  · Follow-up in about a month     Mr. Sally Walton has a reminder for a \"due or due soon\" health maintenance. I have asked that he contact his primary care provider for follow-up on this health maintenance. Margie Mcgregor.  Evelin Curran, KAYLYNP-C

## 2018-04-18 NOTE — LETTER
NOTIFICATION RETURN TO WORK / SCHOOL 
 
4/18/2018 9:33 AM 
 
Mr. Derian Christiansen 
[facility] [phone] To Whom It May Concern: 
 
Derian Christiansen is currently under the care of [address]. He will return to work on Wednesday, April 18, 2018. His activity restrictions have been lifted, but he has been advised to gradually resume his previous level of activity. If there are questions or concerns please have the patient contact our office. Sincerely, Thang Chilel NP

## 2018-05-16 ENCOUNTER — OFFICE VISIT (OUTPATIENT)
Dept: SURGERY | Age: 48
End: 2018-05-16

## 2018-05-16 VITALS
RESPIRATION RATE: 18 BRPM | SYSTOLIC BLOOD PRESSURE: 128 MMHG | WEIGHT: 165 LBS | TEMPERATURE: 98.1 F | BODY MASS INDEX: 26.52 KG/M2 | DIASTOLIC BLOOD PRESSURE: 72 MMHG | OXYGEN SATURATION: 98 % | HEIGHT: 66 IN | HEART RATE: 74 BPM

## 2018-05-16 DIAGNOSIS — Z09 POSTOPERATIVE EXAMINATION: Primary | ICD-10-CM

## 2018-05-16 NOTE — PATIENT INSTRUCTIONS
Hernia: Care Instructions  Your Care Instructions    A hernia develops when tissue bulges through a weak spot in the wall of your belly. The groin area and the navel are common areas for a hernia. A hernia can also develop near the area of a surgery you had before. Pressure from lifting, straining, or coughing can tear the weak area, causing the hernia to bulge and be painful. If you cannot push a hernia back into place, the tissue may become trapped outside the belly wall. If the hernia gets twisted and loses its blood supply, it will swell and die. This is called a strangulated hernia. It usually causes a lot of pain. It needs treatment right away. Some hernias need to be repaired to prevent a strangulated hernia. If your hernia causes symptoms or is large, you may need surgery. Follow-up care is a key part of your treatment and safety. Be sure to make and go to all appointments, and call your doctor if you are having problems. It's also a good idea to know your test results and keep a list of the medicines you take. How can you care for yourself at home? · Take care when lifting heavy objects. · Stay at a healthy weight. · Do not smoke. Smoking can cause coughing, which can cause your hernia to bulge. If you need help quitting, talk to your doctor about stop-smoking programs and medicines. These can increase your chances of quitting for good. · Talk with your doctor before wearing a corset or truss for a hernia. These devices are not recommended for treating hernias and sometimes can do more harm than good. There may be certain situations when your doctor thinks a truss would work, but these are rare. When should you call for help? Call your doctor now or seek immediate medical care if:  ? · You have new or worse belly pain. ? · You are vomiting. ? · You cannot pass stools or gas. ? · You cannot push the hernia back into place with gentle pressure when you are lying down.    ? · The area over the hernia turns red or becomes tender. ? Watch closely for changes in your health, and be sure to contact your doctor if you have any problems. Where can you learn more? Go to http://james-edie.info/. Enter C129 in the search box to learn more about \"Hernia: Care Instructions. \"  Current as of: May 12, 2017  Content Version: 11.4  © 3471-9227 [employer]. Care instructions adapted under license by [employer] (which disclaims liability or warranty for this information). If you have questions about a medical condition or this instruction, always ask your healthcare professional. [address] any warranty or liability for your use of this information.

## 2018-05-16 NOTE — PROGRESS NOTES
Alfred Diss. KD Ragland-C  PROGRESS NOTE    Name: Kit Newman MRN: [id number]   : 1970 Hospital: [facility]   Date: 2018 Admission Date: No admission date for patient encounter. Subjective:  Mr. Kennedy Thomason presents today for a final checkup following left inguinal hernia repair done 2018. He has returned to work though he is reluctant to do anything as physically as he previously did. He reports minor scrotal swelling. He continues to wear compression shorts and elevate his feet nightly. He has not felt any strain or twinges of pain to his groin. He feels he is doing very well and is essentially back to baseline. Objective:  Vitals:    18 0933   BP: 128/72   Pulse: 74   Resp: 18   Temp: 98.1 °F (36.7 °C)   TempSrc: Oral   SpO2: 98%   Weight: 74.8 kg (165 lb)   Height: 5' 6\" (1.676 m)        Physical Exam:   General:  Well developed well nourished male in no apparent distress   Abdomen: abdomen is soft with no tenderness. No masses, organomegaly or   Guarding. Incision is well-healed. Labs:  No results found for this or any previous visit (from the past 24 hour(s)). Current Medications:  Current Outpatient Prescriptions   Medication Sig Dispense Refill    multivitamin (ONE A DAY) tablet Take 1 Tab by mouth daily.  ibuprofen (MOTRIN) 800 mg tablet Take 1 Tab by mouth three (3) times daily as needed for Pain. 40 Tab 0    docusate sodium (COLACE) 100 mg capsule Take 1 Cap by mouth two (2) times a day for 90 days. 60 Cap 2       Chart and notes reviewed. Data reviewed. I have evaluated and examined the patient. IMPRESSION:   · Patient almost 3 months out from left inguinal hernia repair doing very well; essentially back to baseline          PLAN:/DISCUSION:   · Continue to wear compression shorts, elevate legs, and use ice as needed. · Follow-up as needed     Mr. Kennedy Thomason has a reminder for a \"due or due soon\" health maintenance.  I have asked that he contact his primary care provider for follow-up on this health maintenance. Renella Schaumann.  Mike Chaudhry, KD-C

## 2019-05-21 ENCOUNTER — HOSPITAL ENCOUNTER (OUTPATIENT)
Dept: PHYSICAL THERAPY | Age: 49
Discharge: HOME | End: 2019-05-21
Payer: COMMERCIAL

## 2019-05-21 PROCEDURE — 97110 THERAPEUTIC EXERCISES: CPT

## 2019-05-21 PROCEDURE — 97162 PT EVAL MOD COMPLEX 30 MIN: CPT

## 2019-05-21 PROCEDURE — 97530 THERAPEUTIC ACTIVITIES: CPT

## 2019-05-21 PROCEDURE — 97140 MANUAL THERAPY 1/> REGIONS: CPT
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
## 2019-05-24 ENCOUNTER — HOSPITAL ENCOUNTER (OUTPATIENT)
Dept: PHYSICAL THERAPY | Age: 49
Discharge: HOME | End: 2019-05-24
Payer: COMMERCIAL

## 2019-05-24 PROCEDURE — 97110 THERAPEUTIC EXERCISES: CPT

## 2019-05-24 NOTE — PROGRESS NOTES
PT DAILY TREATMENT NOTE 10-18    Patient Name: Marc Hernandez  Date:2019  : 1970  [x]  Patient  Verified  Payor: Ramon Rasheed / Plan: Chaim Inman / Product Type: MIYA /    In time:730  Out time:800  Total Treatment Time (min): 30  Visit #: 2 of     Treatment Area: Low back pain [M54.5]    SUBJECTIVE  Pain Level (0-10 scale): 2/10  Any medication changes, allergies to medications, adverse drug reactions, diagnosis change, or new procedure performed?: [x] No    [] Yes (see summary sheet for update)  Subjective functional status/changes:   [] No changes reported  Pt stated that he feels better since eval and doing the exercises    OBJECTIVE    30 min Therapeutic Exercise:  [x] See flow sheet :   Rationale: increase ROM and increase strength to improve the patients ability to increase ease with ADLs    With   [x] TE   [] TA   [] neuro   [] other: Patient Education: [x] Review HEP    [] Progressed/Changed HEP based on:   [] positioning   [] body mechanics   [] transfers   [] heat/ice application    [] other:      Other Objective/Functional Measures:   Had no complaint of increased low back pain during session   program was challenging  Had some difficulty with SLR abd  Declined MH     Pain Level (0-10 scale) post treatment: 0/10    ASSESSMENT/Changes in Function:   Initiated therex today per flow sheet. Pt put forth good effort with all exercises. Reported compliance with HEP. Patient will continue to benefit from skilled PT services to address functional mobility deficits, address ROM deficits and address strength deficits to attain remaining goals. [x]  See Plan of Care  []  See progress note/recertification  []  See Discharge Summary         Progress towards goals / Updated goals:  Short Term Goals: To be accomplished in 1 weeks:  1. Pt will be compliant with initial HEP in order to optimize therapy outcomes   Goal met. 19    Long Term Goals: To be accomplished in 8 weeks:  1.  Pt will improve FOTO by 30 points in order to demonstrate functional improvement. 2. Pt will report 50% improvement in sx in order to improve QOL. 3. Pt will improve B hip abduction/extension strength to 4+/5 in order maintain pelvic alignment for reduced pain/improved ease with work tasks. 4. Pt will report no difficulty with performing work tasks in order to improve QOL.     PLAN  []  Upgrade activities as tolerated     [x]  Continue plan of care  []  Update interventions per flow sheet       []  Discharge due to:_  []  Other:_      Soni Angel PTA 5/24/2019  7:33 AM    Future Appointments   Date Time Provider Lisa Samuel   5/28/2019  7:30 AM James , PTA [facility]   5/30/2019  7:30 AM Rosa Plump, PT [facility]   6/4/2019  7:30 AM Rosa Plump, PT [facility]   6/6/2019  8:00 AM James , PTA [facility]   6/18/2019  7:30 AM Rosa Plump, PT [facility]   6/20/2019  7:30 AM James , PTA [facility]

## 2019-05-28 ENCOUNTER — HOSPITAL ENCOUNTER (OUTPATIENT)
Dept: PHYSICAL THERAPY | Age: 49
Discharge: HOME | End: 2019-05-28
Payer: COMMERCIAL

## 2019-05-28 PROCEDURE — 97140 MANUAL THERAPY 1/> REGIONS: CPT

## 2019-05-28 PROCEDURE — 97110 THERAPEUTIC EXERCISES: CPT

## 2019-05-28 NOTE — PROGRESS NOTES
PT DAILY TREATMENT NOTE 10-18    Patient Name: Josephine Campuzano  Date:2019  : 1970  [x]  Patient  Verified  Payor: Ayaan Medico / Plan: Ivette [place] / Product Type: MIYA /    In time:727  Out time:755  Total Treatment Time (min): 32  Visit #: 3 of     Treatment Area: Low back pain [M54.5]    SUBJECTIVE  Pain Level (0-10 scale): 1/10  Any medication changes, allergies to medications, adverse drug reactions, diagnosis change, or new procedure performed?: [x] No    [] Yes (see summary sheet for update)  Subjective functional status/changes:   [] No changes reported  Pt stated that he has been having a stabbing pain in the middle of his back when he is up on a ladder    OBJECTIVE    24 min Therapeutic Exercise:  [x] See flow sheet :   Rationale: increase ROM and increase strength to improve the patients ability to increase ease with ADLs    8 min Manual Therapy:  Pelvic alignment check, MET and shot gun to correct right AI   Rationale: decrease pain, increase ROM and increase tissue extensibility to increase ease with ADLs    With   [x] TE   [] TA   [] neuro   [] other: Patient Education: [x] Review HEP    [] Progressed/Changed HEP based on:   [] positioning   [] body mechanics   [] transfers   [] heat/ice application    [] other:      Other Objective/Functional Measures:   Pt has slight right AI which was correct with MET and shotgun  No complaint of increased pain during session  Program cont to be challenging     Pain Level (0-10 scale) post treatment: 0/10    ASSESSMENT/Changes in Function:   Pt is slowly progressing toward goals. Pt cont with mild pain that increases with work duties. Pt cont with weakness in core and B hips. Patient will continue to benefit from skilled PT services to modify and progress therapeutic interventions, address functional mobility deficits, address ROM deficits and address strength deficits to attain remaining goals.      [x]  See Plan of Care  []  See progress note/recertification  []  See Discharge Summary         Progress towards goals / Updated goals:  Short Term Goals: To be accomplished in 1 weeks:  1. Pt will be compliant with initial HEP in order to optimize therapy outcomes   Goal met. 5/24/19     Long Term Goals: To be accomplished in 8 weeks:  1. Pt will improve FOTO by 30 points in order to demonstrate functional improvement. 2. Pt will report 50% improvement in sx in order to improve QOL. 3. Pt will improve B hip abduction/extension strength to 4+/5 in order maintain pelvic alignment for reduced pain/improved ease with work tasks. 4. Pt will report no difficulty with performing work tasks in order to improve QOL. Not met. Pt reported a stabbing pain in the low back with being up on a ladder.  5/28/19    PLAN  []  Upgrade activities as tolerated     [x]  Continue plan of care  []  Update interventions per flow sheet       []  Discharge due to:_  []  Other:_      Rylee Dyson PTA 5/28/2019  7:27 AM    Future Appointments   Date Time Provider Lisa Samuel   5/28/2019  7:30 AM Walter Stover, PTA [facility]   5/30/2019  7:30 AM Donnie Christianson, PT [facility]   6/4/2019  7:30 AM Donnie Christianson, PT [facility]   6/6/2019  8:00 AM Walter Stover, PTA [facility]   6/18/2019  7:30 AM Donnie Christianson, PT [facility]   6/20/2019  7:30 AM Walter Stover, PTA [facility]

## 2019-05-30 ENCOUNTER — HOSPITAL ENCOUNTER (OUTPATIENT)
Dept: PHYSICAL THERAPY | Age: 49
Discharge: HOME | End: 2019-05-30
Payer: COMMERCIAL

## 2019-05-30 PROCEDURE — 97110 THERAPEUTIC EXERCISES: CPT

## 2019-05-30 NOTE — PROGRESS NOTES
PT DAILY TREATMENT NOTE 10-18    Patient Name: Lyssa Malone  Date:2019  : 1970  [x]  Patient  Verified  Payor: Katerina Augustin / Plan: Karl Rust / Product Type: MIYA /    In time:7:29  Out time:7:59  Total Treatment Time (min): 30  Visit #: 4 of     Treatment Area: Low back pain [M54.5]    SUBJECTIVE  Pain Level (0-10 scale): 0/10  Any medication changes, allergies to medications, adverse drug reactions, diagnosis change, or new procedure performed?: [x] No    [] Yes (see summary sheet for update)  Subjective functional status/changes:   [] No changes reported  Pt reports 95-99% improvement with therapy. He has been feeling better since the initial evaluation. He has been keeping a log of his BP and his BP is a little higher in the morning but then lowers as the day progresses. He had pain in his central l/s after ~30 of standing and reaching on a ladder while at work. No pain with any other typical daily tasks. OBJECTIVE      30 min Therapeutic Exercise:  [x] See flow sheet :   Rationale: increase ROM and increase strength to improve the patients ability to improve ease of work tasks            With   [] TE   [] TA   [] neuro   [] other: Patient Education: [x] Review HEP    [] Progressed/Changed HEP based on:   [] positioning   [] body mechanics   [] transfers   [] heat/ice application    [] other:      Other Objective/Functional Measures:     Pain over left SI joint with sidelying hip abduction on left, ceased after 6 reps d/t pain, pain subsided immediately upon ceasing   Minor left AI, corrected with self-MET with manual shotgun    Minor stiffness reported following correction of left AI that was relieved with position change  1/2 foam roll placed across l/s to reduce trunk rotation compensation with quadruped interventions     Pain Level (0-10 scale) post treatment: 0/10    ASSESSMENT/Changes in Function:     Pt is making steady progress towards initial goals in therapy.   He reports significant reduction in pain levels and 95-99% improvement in symptoms. The only work task that results in pain is prolonged reaching while standing on a ladder, likely with remaining core/hip stability deficits contributing. Will continue to address core/hip stability deficits in order to improve ease of work tasks and allow for return to PLOF. Patient will continue to benefit from skilled PT services to modify and progress therapeutic interventions, address functional mobility deficits, address ROM deficits, address strength deficits, analyze and address soft tissue restrictions, analyze and cue movement patterns, analyze and modify body mechanics/ergonomics, assess and modify postural abnormalities and instruct in home and community integration to attain remaining goals. Progress towards goals / Updated goals:  Short Term Goals: To be accomplished in 1 weeks:  1. Pt will be compliant with initial HEP in order to optimize therapy outcomes   Goal met. 5/24/19     Long Term Goals: To be accomplished in 8 weeks:  1. Pt will improve FOTO by 30 points in order to demonstrate functional improvement. 2. Pt will report 50% improvement in sx in order to improve QOL. Goal met. Pt reports 95-99% improvement with therapy 5/30/19   3. Pt will improve B hip abduction/extension strength to 4+/5 in order maintain pelvic alignment for reduced pain/improved ease with work tasks. 4. Pt will report no difficulty with performing work tasks in order to improve QOL. Not met. Pt reported an aching pain in the low back with being up on a ladder.  5/30/19        PLAN  [x]  Upgrade activities as tolerated     [x]  Continue plan of care  []  Update interventions per flow sheet       []  Discharge due to:_  []  Other:_      Munira Garay, PT 5/30/2019  7:33 AM    Future Appointments   Date Time Provider Lisa Samuel   6/4/2019  7:30 AM [facility]sol Nurse[facility]   6/6/2019  8:00 AM Valdez Wong PTA [facility]   6/18/2019  7:30 AM Li Dumont PT [facility]   6/20/2019  7:30 AM Blaire Calderon PTA [facility]

## 2019-06-04 ENCOUNTER — HOSPITAL ENCOUNTER (OUTPATIENT)
Dept: PHYSICAL THERAPY | Age: 49
Discharge: HOME | End: 2019-06-04
Payer: COMMERCIAL

## 2019-06-04 PROCEDURE — 97110 THERAPEUTIC EXERCISES: CPT

## 2019-06-04 NOTE — PROGRESS NOTES
PT DAILY TREATMENT NOTE 10-18    Patient Name: Osmany Steve  Date:2019  : 1970  [x]  Patient  Verified  Payor: Cary Eagle / Plan: Devonte Shelton / Product Type: MIYA /    In time:7:30  Out time:8:00  Total Treatment Time (min): 30  Visit #: 5 of -    Treatment Area: Low back pain [M54.5]    SUBJECTIVE  Pain Level (0-10 scale): 0/10  Any medication changes, allergies to medications, adverse drug reactions, diagnosis change, or new procedure performed?: [x] No    [] Yes (see summary sheet for update)  Subjective functional status/changes:   [] No changes reported  Pt reports he believes he is ready for DC soon. He has not tried standing on a ladder to work again, but he needs to stand on a ladder to complete work tasks this afternoon. OBJECTIVE      30 min Therapeutic Exercise:  [x] See flow sheet :   Rationale: increase ROM and increase strength to improve the patients ability to improve ease of work tasks         With   [] TE   [] TA   [] neuro   [] other: Patient Education: [x] Review HEP    [] Progressed/Changed HEP based on:   [] positioning   [] body mechanics   [] transfers   [] heat/ice application    [] other:      Other Objective/Functional Measures:     Gave and reviewed final HEP  Gave pt plum TB for perpendicular press with HEP  No pain/difficulty with interventions      Pain Level (0-10 scale) post treatment: 0/10    ASSESSMENT/Changes in Function:     Pt is making steady progress in therapy. He continues to report no pain with typical daily tasks. He had pain with completing prolonged tasks on a ladder at work but has not tried recently; however, pt is completing tasks on a ladder with work tasks this afternoon. Gave and reviewed final HEP to allow for continued core/hip strength progression independently following DC. Anticipated DC within the next 1-2 sessions pending continued reduced pain levels and progression towards goals.       Patient will continue to benefit from skilled PT services to modify and progress therapeutic interventions, address ROM deficits, address strength deficits, analyze and address soft tissue restrictions, analyze and cue movement patterns, assess and modify postural abnormalities and instruct in home and community integration to attain remaining goals. Progress towards goals / Updated goals:  Short Term Goals: To be accomplished in 1 weeks:  1. Pt will be compliant with initial HEP in order to optimize therapy outcomes   Goal met. 5/24/19     Long Term Goals: To be accomplished in 8 weeks:  1. Pt will improve FOTO by 30 points in order to demonstrate functional improvement. 2. Pt will report 50% improvement in sx in order to improve QOL. Goal met. Pt reports 95-99% improvement with therapy 5/30/19   3. Pt will improve B hip abduction/extension strength to 4+/5 in order maintain pelvic alignment for reduced pain/improved ease with work tasks. 4. Pt will report no difficulty with performing work tasks in order to improve QOL. Not met. Pt reported an aching pain in the low back with being up on a ladder.  5/30/19     PLAN  []  Upgrade activities as tolerated     [x]  Continue plan of care  []  Update interventions per flow sheet       []  Discharge due to:_  []  Other:_      Evan Chun, PT 6/4/2019  7:39 AM    Future Appointments   Date Time Provider Lisa Samuel   6/6/2019  8:00 AM Katiuska Daniels PTA [facility]   6/18/2019  7:30 AM Ranjan Skinner PT [facility]   6/20/2019  7:30 AM Katiuska Daniels PTA [facility]

## 2019-06-06 ENCOUNTER — APPOINTMENT (OUTPATIENT)
Dept: PHYSICAL THERAPY | Age: 49
End: 2019-06-06
Payer: COMMERCIAL

## 2019-06-18 ENCOUNTER — APPOINTMENT (OUTPATIENT)
Dept: PHYSICAL THERAPY | Age: 49
End: 2019-06-18
Payer: COMMERCIAL

## 2019-06-19 ENCOUNTER — HOSPITAL ENCOUNTER (OUTPATIENT)
Dept: PHYSICAL THERAPY | Age: 49
Discharge: HOME | End: 2019-06-19
Payer: COMMERCIAL

## 2019-06-19 PROCEDURE — 97110 THERAPEUTIC EXERCISES: CPT

## 2019-06-19 NOTE — PROGRESS NOTES
PT DAILY TREATMENT NOTE 10-18    Patient Name: Sukumar Negron  Date:2019  : 1970  [x]  Patient  Verified  Payor: Rk Lindsey / Plan: Karli Bosch / Product Type: MIYA /    In time:900  Out time:925  Total Treatment Time (min): 25  Visit #: 6 of     Treatment Area: Low back pain [M54.5]    SUBJECTIVE  Pain Level (0-10 scale): 0/10  Any medication changes, allergies to medications, adverse drug reactions, diagnosis change, or new procedure performed?: [x] No    [] Yes (see summary sheet for update)  Subjective functional status/changes:   [] No changes reported  Pt stated that he is doing fine today    OBJECTIVE    25 min Therapeutic Exercise:  [x] See flow sheet :   Rationale: increase ROM and increase strength to improve the patients ability to increase ease with ADLs    With   [x] TE   [] TA   [] neuro   [] other: Patient Education: [x] Review HEP    [] Progressed/Changed HEP based on:   [] positioning   [] body mechanics   [] transfers   [] heat/ice application    [] other:      Other Objective/Functional Measures:   No difficulty with exercises  No complaint of pain during session     Pain Level (0-10 scale) post treatment: 0/10    ASSESSMENT/Changes in Function:   Pt is progressing well toward goals and is to be discharged next visit    Patient will continue to benefit from skilled PT services to modify and progress therapeutic interventions, address functional mobility deficits, address ROM deficits and address strength deficits to attain remaining goals. [x]  See Plan of Care  []  See progress note/recertification  []  See Discharge Summary         Progress towards goals / Updated goals:  Short Term Goals: To be accomplished in 1 weeks:  1. Pt will be compliant with initial HEP in order to optimize therapy outcomes   Goal met. 19     Long Term Goals: To be accomplished in 8 weeks:  1. Pt will improve FOTO by 30 points in order to demonstrate functional improvement.   2. Pt will report 50% improvement in sx in order to improve QOL.   Goal met.  Pt reports 95-99% improvement with therapy 5/30/19   3. Pt will improve B hip abduction/extension strength to 4+/5 in order maintain pelvic alignment for reduced pain/improved ease with work tasks. 4. Pt will report no difficulty with performing work tasks in order to improve QOL. Not met.  Pt reported an aching pain in the low back with being up on a ladder. 5/30/19     PLAN  []  Upgrade activities as tolerated     [x]  Continue plan of care  []  Update interventions per flow sheet       [x]  Discharge next visit  []  Other:_      Nicky Orozco PTA 6/19/2019  9:03 AM    Future Appointments   Date Time Provider Lisa Samuel   6/20/2019  7:30 AM Herman Alvarez PTA [facility]

## 2019-06-20 ENCOUNTER — HOSPITAL ENCOUNTER (OUTPATIENT)
Dept: PHYSICAL THERAPY | Age: 49
Discharge: HOME | End: 2019-06-20
Payer: COMMERCIAL

## 2019-06-20 PROCEDURE — 97110 THERAPEUTIC EXERCISES: CPT

## 2019-06-20 NOTE — PROGRESS NOTES
PT DAILY TREATMENT NOTE 10-18    Patient Name: Lyssa Malone  Date:2019  : 1970  [x]  Patient  Verified  Payor: Katerina Augustin / Plan: Karl Rust / Product Type: MIYA /    In time:729  Out time:745  Total Treatment Time (min): 16  Visit #: 7 of     Treatment Area: Low back pain [M54.5]    SUBJECTIVE  Pain Level (0-10 scale): 0/10  Any medication changes, allergies to medications, adverse drug reactions, diagnosis change, or new procedure performed?: [x] No    [] Yes (see summary sheet for update)  Subjective functional status/changes:   [] No changes reported  Pt stated that he did not want to do the exercises today    OBJECTIVE    16 min Therapeutic Exercise:  [x] See flow sheet :   Rationale: increase ROM and increase strength to improve the patients ability to increase ease with ADLs    With   [x] TE   [] TA   [] neuro   [] other: Patient Education: [x] Review HEP    [] Progressed/Changed HEP based on:   [] positioning   [] body mechanics   [] transfers   [] heat/ice application    [] other:      Other Objective/Functional Measures:   HEP was reviewed  FOTO 94  Goals were checked see below     Pain Level (0-10 scale) post treatment: 0/10    ASSESSMENT/Changes in Function:   []  See Plan of Care  []  See progress note/recertification  [x]  See Discharge Summary         Progress towards goals / Updated goals:  Short Term Goals: To be accomplished in 1 weeks:  1. Pt will be compliant with initial HEP in order to optimize therapy outcomes   Goal met. 19     Long Term Goals: To be accomplished in 8 weeks:  1. Pt will improve FOTO by 30 points in order to demonstrate functional improvement. Goal met. Increased from 30 to 94. 19  2. Pt will report 50% improvement in sx in order to improve QOL.   Goal met.  Pt reports 95-99% improvement with therapy 19   3.  Pt will improve B hip abduction/extension strength to 4+/5 in order maintain pelvic alignment for reduced pain/improved ease with work tasks.  Goal met. Strength for B hip abd and ext is 5/5. 6/20/19  4. Pt will report no difficulty with performing work tasks in order to improve QOL. Goal met. 6/20/19     PLAN  []  Upgrade activities as tolerated     []  Continue plan of care  []  Update interventions per flow sheet       [x]  Discharge due to:program complete and goals met  []  Other:_      Magno Santos, FREDDIE 6/20/2019  7:30 AM    No future appointments.

## 2019-07-10 ENCOUNTER — HOSPITAL ENCOUNTER (EMERGENCY)
Age: 49
Discharge: HOME | End: 2019-07-10
Attending: EMERGENCY MEDICINE
Payer: COMMERCIAL

## 2019-07-10 ENCOUNTER — OFFICE VISIT (OUTPATIENT)
Dept: ORTHOPEDIC SURGERY | Age: 49
End: 2019-07-10

## 2019-07-10 ENCOUNTER — APPOINTMENT (OUTPATIENT)
Dept: GENERAL RADIOLOGY | Age: 49
End: 2019-07-10
Attending: EMERGENCY MEDICINE
Payer: COMMERCIAL

## 2019-07-10 VITALS
RESPIRATION RATE: 18 BRPM | OXYGEN SATURATION: 96 % | WEIGHT: 170 LBS | HEIGHT: 66 IN | HEART RATE: 85 BPM | BODY MASS INDEX: 27.32 KG/M2 | SYSTOLIC BLOOD PRESSURE: 142 MMHG | TEMPERATURE: 97.8 F | DIASTOLIC BLOOD PRESSURE: 85 MMHG

## 2019-07-10 VITALS
OXYGEN SATURATION: 98 % | TEMPERATURE: 97 F | RESPIRATION RATE: 17 BRPM | HEIGHT: 66 IN | WEIGHT: 166.8 LBS | DIASTOLIC BLOOD PRESSURE: 77 MMHG | BODY MASS INDEX: 26.81 KG/M2 | HEART RATE: 66 BPM | SYSTOLIC BLOOD PRESSURE: 115 MMHG

## 2019-07-10 DIAGNOSIS — M79.671 RIGHT FOOT PAIN: ICD-10-CM

## 2019-07-10 DIAGNOSIS — S92.514A CLOSED NONDISPLACED FRACTURE OF PROXIMAL PHALANX OF LESSER TOE OF RIGHT FOOT, INITIAL ENCOUNTER: Primary | ICD-10-CM

## 2019-07-10 DIAGNOSIS — S92.401A CLOSED DISPLACED FRACTURE OF PHALANX OF RIGHT GREAT TOE, UNSPECIFIED PHALANX, INITIAL ENCOUNTER: Primary | ICD-10-CM

## 2019-07-10 PROCEDURE — 73660 X-RAY EXAM OF TOE(S): CPT

## 2019-07-10 PROCEDURE — 77030013175 HC SHOE PSTOP DJOR -A

## 2019-07-10 PROCEDURE — 99283 EMERGENCY DEPT VISIT LOW MDM: CPT

## 2019-07-10 RX ORDER — HYDROCODONE BITARTRATE AND ACETAMINOPHEN 5; 325 MG/1; MG/1
1 TABLET ORAL
Qty: 8 TAB | Refills: 0 | Status: SHIPPED | OUTPATIENT
Start: 2019-07-10 | End: 2019-07-13

## 2019-07-10 RX ORDER — IBUPROFEN 800 MG/1
800 TABLET ORAL EVERY 8 HOURS
Qty: 15 TAB | Refills: 0 | Status: SHIPPED | OUTPATIENT
Start: 2019-07-10 | End: 2019-07-10

## 2019-07-10 NOTE — ED PROVIDER NOTES
EMERGENCY DEPARTMENT HISTORY AND PHYSICAL EXAM    Date: 7/10/2019  Patient Name: Yarelis Redd    History of Presenting Illness     Chief Complaint   Patient presents with    Toe Pain         History Provided By: Patient        Additional History (Context): Yarelis Redd is a 47 y.o. male with No significant past medical history who presents with right middle toe pain. Said this morning he was sitting at the couch heard his wife shout out his name for help upstairs to the children and he jumped up and banged his toe against the table in front of the sofa. Has immense pain with trying to ambulate. Denies weakness or prior injury. PCP: John Macario MD    Current Outpatient Medications   Medication Sig Dispense Refill    HYDROcodone-acetaminophen (NORCO) 5-325 mg per tablet Take 1 Tab by mouth every six (6) hours as needed for Pain for up to 3 days. Max Daily Amount: 4 Tabs. Take 1 tablet PO every 6 hours as needed for pain control. 8 Tab 0    ibuprofen (MOTRIN) 800 mg tablet Take 1 Tab by mouth every eight (8) hours for 5 days. 15 Tab 0    ibuprofen (MOTRIN) 800 mg tablet Take 1 Tab by mouth three (3) times daily as needed for Pain. 40 Tab 0    multivitamin (ONE A DAY) tablet Take 1 Tab by mouth daily. Past History     Past Medical History:  Past Medical History:   Diagnosis Date    Kidney stone        Past Surgical History:  Past Surgical History:   Procedure Laterality Date    REPAIR ING HERNIA,5+Y/O,REDUCIBL Left 03/02/2018    Dr. Claudette Jules       Family History:  Family History   Problem Relation Age of Onset    Diabetes Mother        Social History:  Social History     Tobacco Use    Smoking status: Never Smoker    Smokeless tobacco: Never Used   Substance Use Topics    Alcohol use: No    Drug use: No       Allergies:  No Known Allergies      Review of Systems   Review of Systems   Constitutional: Negative for fever. Musculoskeletal: Positive for arthralgias and gait problem. Neurological: Negative for weakness and numbness. All Other Systems Negative  Physical Exam     Vitals:    07/10/19 0817   BP: 142/85   Pulse: 85   Resp: 18   Temp: 97.8 °F (36.6 °C)   SpO2: 96%   Weight: 77.1 kg (170 lb)   Height: 5' 6\" (1.676 m)     Physical Exam   Constitutional: Vital signs are normal. He appears well-developed and well-nourished. He is active. Non-toxic appearance. He does not appear ill. No distress. HENT:   Head: Normocephalic and atraumatic. Neck: Normal range of motion. Neck supple. Carotid bruit is not present. No tracheal deviation present. No thyromegaly present. Cardiovascular: Normal rate, regular rhythm and normal heart sounds. Exam reveals no gallop and no friction rub. No murmur heard. Pulmonary/Chest: Effort normal and breath sounds normal. No stridor. No respiratory distress. He has no wheezes. He has no rales. He exhibits no tenderness. Abdominal: Soft. He exhibits no distension and no mass. There is no tenderness. There is no rebound, no guarding and no CVA tenderness. Musculoskeletal: He exhibits tenderness and deformity. Right middle toe obvious deformity with displacement of the middle and distal portions of his third toe in the varus orientation. Cap refill less than 2 seconds. Sensation intact throughout. There is moderate proximal phalanx tenderness to palpation. Neurological: He is alert. Skin: Skin is warm, dry and intact. He is not diaphoretic. No pallor. Psychiatric: He has a normal mood and affect. His speech is normal and behavior is normal. Judgment and thought content normal.   Nursing note and vitals reviewed. Diagnostic Study Results     Labs -   No results found for this or any previous visit (from the past 12 hour(s)).     Radiologic Studies -   XR 3RD TOE RT MIN 2 V    (Results Pending)     CT Results  (Last 48 hours)    None        CXR Results  (Last 48 hours)    None            Medical Decision Making   I am the first provider for this patient. I reviewed the vital signs, available nursing notes, past medical history, past surgical history, family history and social history. Vital Signs-Reviewed the patient's vital signs. Records Reviewed: Nursing Notes    Procedures:  Procedures    Provider Notes (Medical Decision Making):   Broken. Buddy tape cast shoe refer to Ortho. Treat pain. Buddy tape applied by tech; excellent position. Neurovascularly intact before and after procedure. MED RECONCILIATION:  No current facility-administered medications for this encounter. Current Outpatient Medications   Medication Sig    HYDROcodone-acetaminophen (NORCO) 5-325 mg per tablet Take 1 Tab by mouth every six (6) hours as needed for Pain for up to 3 days. Max Daily Amount: 4 Tabs. Take 1 tablet PO every 6 hours as needed for pain control.  ibuprofen (MOTRIN) 800 mg tablet Take 1 Tab by mouth every eight (8) hours for 5 days.  ibuprofen (MOTRIN) 800 mg tablet Take 1 Tab by mouth three (3) times daily as needed for Pain.  multivitamin (ONE A DAY) tablet Take 1 Tab by mouth daily. Disposition:  home    DISCHARGE NOTE:   8:59 AM    Pt has been reexamined. Patient has no new complaints, changes, or physical findings. Care plan outlined and precautions discussed. Results of x-rays were reviewed with the patient. All medications were reviewed with the patient; will d/c home with see below. All of pt's questions and concerns were addressed. Patient was instructed and agrees to follow up with ortho, as well as to return to the ED upon further deterioration. Patient is ready to go home.     Follow-up Information     Follow up With Specialties Details Why Contact Darius Caldwell MD Orthopedic Surgery Schedule an appointment as soon as possible for a visit today  Madhuri Cruz [facility] [address]      [facility] EMERGENCY DEPT Emergency Medicine  If symptoms worsen return immediately [address]  [phone]          Current Discharge Medication List      START taking these medications    Details   HYDROcodone-acetaminophen (NORCO) 5-325 mg per tablet Take 1 Tab by mouth every six (6) hours as needed for Pain for up to 3 days. Max Daily Amount: 4 Tabs. Take 1 tablet PO every 6 hours as needed for pain control. Qty: 8 Tab, Refills: 0    Associated Diagnoses: Closed displaced fracture of phalanx of right great toe, unspecified phalanx, initial encounter      !! ibuprofen (MOTRIN) 800 mg tablet Take 1 Tab by mouth every eight (8) hours for 5 days. Qty: 15 Tab, Refills: 0       !! - Potential duplicate medications found. Please discuss with provider. CONTINUE these medications which have NOT CHANGED    Details   !! ibuprofen (MOTRIN) 800 mg tablet Take 1 Tab by mouth three (3) times daily as needed for Pain. Qty: 40 Tab, Refills: 0    Associated Diagnoses: Postoperative pain       !! - Potential duplicate medications found. Please discuss with provider. Diagnosis     Clinical Impression:   1.  Closed displaced fracture of phalanx of right great toe, unspecified phalanx, initial encounter

## 2019-07-10 NOTE — PROGRESS NOTES
1. Have you been to the ER, urgent care clinic since your last visit? Hospitalized since your last visit? Yes When: 07/10/2019 Where: [facility] Reason for visit: toe pain    2. Have you seen or consulted any other health care providers outside of the [address] [place] [address] since your last visit? Include any pap smears or colon screening.  No

## 2019-07-10 NOTE — PROGRESS NOTES
AMBULATORY PROGRESS NOTE      Patient: Fela Vidal             MRN: [id number]     SSN: [id number] Body mass index is 26.92 kg/m². YOB: 1970     AGE: 47 y.o. SEX: male    PCP: Jesus Deng MD     IMPRESSION/DIAGNOSIS AND TREATMENT PLAN     DIAGNOSES  1. Closed nondisplaced fracture of proximal phalanx of lesser toe of right foot, initial encounter    2. Right foot pain        Orders Placed This Encounter    AMB SUPPLY ORDER    [24916] Foot 2V      Fela Vidal understands his diagnoses and the proposed plan. The patient is here for assessment. The patient has an oblique fracture to the right number three toe proximal phalanx. He had x-rays today, post-reduction films. (The reduction was performed by the ER staff.), but I obtained x-rays, as no post-reduction alignment x-rays were obtained. X-rays of the right foot, three views, show excellent alignment of the right number three toe proximal phalanx of this long, oblique fracture. No malalignment is seen on these three views. I had a lengthy discussion with him regarding his fracture. Recommendation is continued buddy taping for at least four to six weeks. We will put him in a CAM walker boot or at least write a prescription for this, as we do not have one his size at this current time. We will see him in two weeks. He understands to be careful. 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 Plan:    1) Continue buddy taping of the right #2,3 toes. 2) Continue using the hard sole shoe as directed. 3) DME Order: Short right CAM walker boot. 4) Take OTC Ibuprofen as needed for pain. 5) Continue activity modification as directed.       RTO - 2 weeks // PLEASE OBTAIN X-RAYS OF: right foot 3 VIEWS      HPI AND EXAMINATION     Fela Vidal IS A 47 y.o. male who presents to my outpatient office complaining of right foot pain. Mr. Yarelis Ely states that he was running to go to his wife after she called his name in the home when he accidentally struck his foot against a table. This injury occurred today and he was seen at the  ED today, where he had his right #2,3 toes taped together and was placed in a hard sole shoe. He states that he is comfortable in the hard sole shoe. He reports that he was given Ibuprofen and Norco at the ED. Date of injury: 07/10/2019. 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 Visit Vitals  /77 (BP 1 Location: Left arm, BP Patient Position: Sitting)   Pulse 66   Temp 97 °F (36.1 °C) (Oral)   Resp 17   Ht 5' 6\" (1.676 m)   Wt 166 lb 12.8 oz (75.7 kg)   SpO2 98%   BMI 26.92 kg/m²     Appearance: Alert, well appearing and pleasant patient who is in no distress, oriented to person, place/time, and who follows commands. This patient is accompanied in the examination room by his self. Dementia: no dementia  Psychiatric: Affect and mood are appropriate. Patient arrives to office via: with assistive device: hard sole shoe  HEENT: Head normocephalic & atraumatic. Both pupils are round, non icteric sclera   Eye: EOM are intact and sclera are clear    Neck: ROM WNL and JVD neck is not present     Hearings Intact, does not require hearing aid device  Respiratory: Breathing is unlabored without accessory chest muscle use  Cardiovascular/Peripheral Vascular: Normal Pulses to each foot    ANKLE/FOOT right    Gait: antalgic, in hard sole shoe. Tenderness: Moderate tenderness to the 3rd proximal phalanx. Cutaneous: Swelling to the 3rd proximal phalanx. Joint Motion: Not tested. Joint / Tendon Stability: No Ankle or Subtalar instability or joint laxity. No peroneal sublux ability or dislocation  Alignment: Forefoot, Midfoot, Hindfoot WNL. Neuro Motor/Sensory: NL/NL. Vascular: NL foot/ankle pulses.   Lymphatics: No extremity lymphedema, No calf swelling, no tenderness to calf muscles. CHART REVIEW     Past Medical History:   Diagnosis Date    Kidney stone      Current Outpatient Medications   Medication Sig    HYDROcodone-acetaminophen (NORCO) 5-325 mg per tablet Take 1 Tab by mouth every six (6) hours as needed for Pain for up to 3 days. Max Daily Amount: 4 Tabs. Take 1 tablet PO every 6 hours as needed for pain control.  ibuprofen (MOTRIN) 800 mg tablet Take 1 Tab by mouth three (3) times daily as needed for Pain.  multivitamin (ONE A DAY) tablet Take 1 Tab by mouth daily. No current facility-administered medications for this visit. No Known Allergies  Past Surgical History:   Procedure Laterality Date    REPAIR ING HERNIA,5+Y/O,REDUCIBL Left 03/02/2018    Dr. Fly Cardozo Not on file   Tobacco Use    Smoking status: Never Smoker    Smokeless tobacco: Never Used   Substance and Sexual Activity    Alcohol use: No    Drug use: No    Sexual activity: Not on file     Family History   Problem Relation Age of Onset    Diabetes Mother         REVIEW OF SYSTEMS : 7/10/2019  ALL BELOW ARE Negative except : SEE HPI     Constitutional: Negative for fever, chills and weight loss. Neg Weight Loss  Cardiovascular: Negative for chest pain, claudication and leg swelling. SOB, GAITAN   Gastrointestinal/Urological: Negative for  pain, N/V/D/C, Blood in stool or urine,dysuria                         Hematuria, Incontinence, pelvic pain  Musculoskeletal: see HPI. Neurological: Negative for dizziness and weakness, headaches,Visual Changes             Confusion,  Or Seizures,   Psychiatric/Behavioral: Negative for depression, memory loss and substance abuse. Extremities:  Negative for hair changes, rash or skin lesion changes. Hematologic: Negative for Bleeding problems, bruising, pallor or swollen lymph nodes.   Peripheral Vascular: No calf pain, vascular vein tenderness to calf pain              No calf throbbing, posterior knee throbbing pain     DIAGNOSTIC IMAGING     No notes on file     XR Results (most recent):  Results from Hospital Encounter encounter on 07/10/19   XR 3RD TOE RT MIN 2 V    Narrative RIGHT TOE RADIOGRAPH-3 VIEWS    INDICATION: Fracture/dislocation    COMPARISON: None    TECHNIQUE: 3 views of the right third toe were obtained. FINDINGS:  Mildly distracted oblique fracture of the third proximal phalanx with mild apex  medial angulation. Surrounding soft tissue swelling present. Visualized joint  spaces are preserved. Impression IMPRESSION:  Fracture of the third proximal phalanx as described. Please see above section of this report. I have personally reviewed the results of the above study. The interpretation of this study is my professional opinion. Written by Fletcher Jackson, as dictated by Dr. Lizzeth Christian. I, Dr. Lizzeth Christian, confirm that all documentation is accurate.

## 2019-07-10 NOTE — DISCHARGE INSTRUCTIONS
Patient Education        Broken Toe: Care Instructions  Your Care Instructions  You have broken (fractured) a bone in your toe. This kind of fracture does not need a special cast or brace. \"Adelaide-taping\" your broken toe to a healthy toe next to it is almost always enough to treat the problem and ease symptoms. The toe may take 4 weeks or more to heal.  You heal best when you take good care of yourself. Eat a variety of healthy foods, and don't smoke. Follow-up care is a key part of your treatment and safety. Be sure to make and go to all appointments, and call your doctor if you are having problems. It's also a good idea to know your test results and keep a list of the medicines you take. How can you care for yourself at home? · Be safe with medicines. Take pain medicines exactly as directed. ? If the doctor gave you a prescription medicine for pain, take it as prescribed. ? If you are not taking a prescription pain medicine, ask your doctor if you can take an over-the-counter medicine. · If your toe is taped to the toe next to it, your doctor has shown you how to change the tape. Protect the skin by putting something soft, such as felt or foam, between your toes before you tape them together. Never tape the toes together skin-to-skin. Your broken toe may need to be adelaide-taped for 2 to 4 weeks to heal.  · Rest and protect your toe. Do not walk on it until you can do so without too much pain. If the doctor has told you to use crutches, use them as instructed. · Put ice or a cold pack on your toe for 10 to 20 minutes at a time. Try to do this every 1 to 2 hours for the next 3 days (when you are awake) or until the swelling goes down. Put a thin cloth between the ice and your skin. · Prop up your foot on a pillow when you ice it or anytime you sit or lie down. Try to keep it above the level of your heart. This will help reduce swelling. · Make sure you go to your follow-up appointments.  Your doctor will need to check that your toe is healing right. When should you call for help? Call your doctor now or seek immediate medical care if:    · You have severe pain.     · Your toe is cool or pale or changes color.     · You have tingling, weakness, or numbness in your toe.    Watch closely for changes in your health, and be sure to contact your doctor if:    · Pain and swelling get worse.     · You are not getting better as expected. Where can you learn more? Go to http://james-edie.info/. Enter B927 in the search box to learn more about \"Broken Toe: Care Instructions. \"  Current as of: September 20, 2018  Content Version: 11.9  © 6973-4325 [employer], [employer]. Care instructions adapted under license by [employer] (which disclaims liability or warranty for this information). If you have questions about a medical condition or this instruction, always ask your healthcare professional. [address] any warranty or liability for your use of this information.

## 2019-07-10 NOTE — PATIENT INSTRUCTIONS
Broken Toe: Care Instructions Your Care Instructions You have broken (fractured) a bone in your toe. This kind of fracture does not need a special cast or brace. \"Adelaide-taping\" your broken toe to a healthy toe next to it is almost always enough to treat the problem and ease symptoms. The toe may take 4 weeks or more to heal. 
You heal best when you take good care of yourself. Eat a variety of healthy foods, and don't smoke. Follow-up care is a key part of your treatment and safety. Be sure to make and go to all appointments, and call your doctor if you are having problems. It's also a good idea to know your test results and keep a list of the medicines you take. How can you care for yourself at home? · Be safe with medicines. Take pain medicines exactly as directed. ? If the doctor gave you a prescription medicine for pain, take it as prescribed. ? If you are not taking a prescription pain medicine, ask your doctor if you can take an over-the-counter medicine. · If your toe is taped to the toe next to it, your doctor has shown you how to change the tape. Protect the skin by putting something soft, such as felt or foam, between your toes before you tape them together. Never tape the toes together skin-to-skin. Your broken toe may need to be adelaide-taped for 2 to 4 weeks to heal. 
· Rest and protect your toe. Do not walk on it until you can do so without too much pain. If the doctor has told you to use crutches, use them as instructed. · Put ice or a cold pack on your toe for 10 to 20 minutes at a time. Try to do this every 1 to 2 hours for the next 3 days (when you are awake) or until the swelling goes down. Put a thin cloth between the ice and your skin. · Prop up your foot on a pillow when you ice it or anytime you sit or lie down. Try to keep it above the level of your heart. This will help reduce swelling. · Make sure you go to your follow-up appointments.  Your doctor will need to check that your toe is healing right. When should you call for help? Call your doctor now or seek immediate medical care if: 
  · You have severe pain.  
  · Your toe is cool or pale or changes color.  
  · You have tingling, weakness, or numbness in your toe.  
 Watch closely for changes in your health, and be sure to contact your doctor if: 
  · Pain and swelling get worse.  
  · You are not getting better as expected. Where can you learn more? Go to http://james-edie.info/. Enter Z728 in the search box to learn more about \"Broken Toe: Care Instructions. \" Current as of: September 20, 2018 Content Version: 11.9 © 8521-1723 [employer], [employer]. Care instructions adapted under license by [employer] (which disclaims liability or warranty for this information). If you have questions about a medical condition or this instruction, always ask your healthcare professional. [address] any warranty or liability for your use of this information.

## 2019-07-11 ENCOUNTER — TELEPHONE (OUTPATIENT)
Dept: ORTHOPEDIC SURGERY | Age: 49
End: 2019-07-11

## 2019-07-11 NOTE — TELEPHONE ENCOUNTER
Patients wife notified that we did receive the medium boots today. Patients wife will come by and  the boot. Please have patient sign the paper for medical necessity.

## 2019-07-11 NOTE — TELEPHONE ENCOUNTER
Patient called stating that he was given an order for a walking boot due not having one in office. He went [facility] and was told they do not take his insurance. He was wondering if there is a way to order one at the office or if there is another medical supply facility that does accept his insurance.  Please advise patient at [phone]

## 2019-07-22 ENCOUNTER — OFFICE VISIT (OUTPATIENT)
Dept: ORTHOPEDIC SURGERY | Age: 49
End: 2019-07-22

## 2019-07-22 VITALS
TEMPERATURE: 97.8 F | HEIGHT: 66 IN | BODY MASS INDEX: 27 KG/M2 | OXYGEN SATURATION: 95 % | DIASTOLIC BLOOD PRESSURE: 76 MMHG | SYSTOLIC BLOOD PRESSURE: 130 MMHG | WEIGHT: 168 LBS | HEART RATE: 86 BPM

## 2019-07-22 DIAGNOSIS — S92.514D CLOSED NONDISPLACED FRACTURE OF PROXIMAL PHALANX OF LESSER TOE OF RIGHT FOOT WITH ROUTINE HEALING, SUBSEQUENT ENCOUNTER: Primary | ICD-10-CM

## 2019-07-22 RX ORDER — IBUPROFEN 800 MG/1
800 TABLET ORAL
Qty: 60 TAB | Refills: 1 | Status: SHIPPED | OUTPATIENT
Start: 2019-07-22

## 2019-07-22 NOTE — PATIENT INSTRUCTIONS
Broken Toe: Care Instructions  Your Care Instructions  You have broken (fractured) a bone in your toe. This kind of fracture does not need a special cast or brace. \"Adelaide-taping\" your broken toe to a healthy toe next to it is almost always enough to treat the problem and ease symptoms. The toe may take 4 weeks or more to heal.  You heal best when you take good care of yourself. Eat a variety of healthy foods, and don't smoke. Follow-up care is a key part of your treatment and safety. Be sure to make and go to all appointments, and call your doctor if you are having problems. It's also a good idea to know your test results and keep a list of the medicines you take. How can you care for yourself at home? · Be safe with medicines. Take pain medicines exactly as directed. ? If the doctor gave you a prescription medicine for pain, take it as prescribed. ? If you are not taking a prescription pain medicine, ask your doctor if you can take an over-the-counter medicine. · If your toe is taped to the toe next to it, your doctor has shown you how to change the tape. Protect the skin by putting something soft, such as felt or foam, between your toes before you tape them together. Never tape the toes together skin-to-skin. Your broken toe may need to be adelaide-taped for 2 to 4 weeks to heal.  · Rest and protect your toe. Do not walk on it until you can do so without too much pain. If the doctor has told you to use crutches, use them as instructed. · Put ice or a cold pack on your toe for 10 to 20 minutes at a time. Try to do this every 1 to 2 hours for the next 3 days (when you are awake) or until the swelling goes down. Put a thin cloth between the ice and your skin. · Prop up your foot on a pillow when you ice it or anytime you sit or lie down. Try to keep it above the level of your heart. This will help reduce swelling. · Make sure you go to your follow-up appointments.  Your doctor will need to check that your toe is healing right. When should you call for help? Call your doctor now or seek immediate medical care if:    · You have severe pain.     · Your toe is cool or pale or changes color.     · You have tingling, weakness, or numbness in your toe.    Watch closely for changes in your health, and be sure to contact your doctor if:    · Pain and swelling get worse.     · You are not getting better as expected. Where can you learn more? Go to http://james-edie.info/. Enter Q670 in the search box to learn more about \"Broken Toe: Care Instructions. \"  Current as of: September 20, 2018  Content Version: 12.1  © 0410-5721 [employer]. Care instructions adapted under license by [employer] (which disclaims liability or warranty for this information). If you have questions about a medical condition or this instruction, always ask your healthcare professional. [address] any warranty or liability for your use of this information.

## 2019-07-22 NOTE — PROGRESS NOTES
AMBULATORY PROGRESS NOTE      Patient: Prudencio Sanchez             MRN: [id number]     SSN: [id number] Body mass index is 27.12 kg/m². YOB: 1970     AGE: 47 y.o. SEX: male    PCP: Garrick Renae MD     IMPRESSION/DIAGNOSIS AND TREATMENT PLAN     DIAGNOSES  1. Closed nondisplaced fracture of proximal phalanx of lesser toe of right foot with routine healing, subsequent encounter        Orders Placed This Encounter    [93831] Foot Min 3V    ibuprofen (MOTRIN) 800 mg tablet      Prudencio Sanchez understands his diagnoses and the proposed plan. X-rays show a fracture to the right third toe proximal phalanx with acceptable alignment on these three views, AP, lateral, and oblique. Plan:    1) Continue buddy taping of the left #2,3 toes. 2) Ibuprofen 800 m PO TIDPRN; 60 tablets, 1 refills. 3) Continue activity modification as directed. RTO - 3 weeks     HPI AND EXAMINATION     Prudencio Sanchez IS A 47 y.o. male who presents to my outpatient office for follow up of a closed nondisplaced fracture of the proximal phalanx of the lesser toe of the right foot. At the last visit, I buddy taped the right #2,3 toes, provided an order for a short right CAM walker boot, instructed the patient to continue activity modification as directed, to continue using the hard sole shoe, and to take OTC Ibuprofen as needed for pain. Date of injury: 07/10/2019. Since we saw him last, Mr. Dorine Aschoff states that he is still experiencing intermittent pain in his right #3 toe. He presents to the office today in his hard sole shoe, as he wears this shoe when he drives. He inquires about whether he can receive another prescription of Ibuprofen 800 mg. 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      Visit Vitals  /76   Pulse 86   Temp 97.8 °F (36.6 °C) (Oral)   Ht 5' 6\" (1.676 m)   Wt 168 lb (76.2 kg)   SpO2 95%   BMI 27.12 kg/m²     Appearance: Alert, well appearing and pleasant patient who is in no distress, oriented to person, place/time, and who follows commands. This patient is accompanied in the examination room by his self. Dementia: no dementia  Psychiatric: Affect and mood are appropriate. Patient arrives to office via: with assistive device: hard sole shoe  HEENT: Head normocephalic & atraumatic. Both pupils are round, non icteric sclera   Eye: EOM are intact and sclera are clear    Neck: ROM WNL and JVD neck is not present     Hearings Intact, does not require hearing aid device  Respiratory: Breathing is unlabored without accessory chest muscle use  Cardiovascular/Peripheral Vascular: Normal Pulses to each foot    ANKLE/FOOT right    Gait: antalgic, in hard sole shoe. Tenderness: Mild tenderness to the 3rd proximal phalanx. Cutaneous: Swelling to the 3rd proximal phalanx. Joint Motion: Not tested. Joint / Tendon Stability: No Ankle or Subtalar instability or joint laxity. No peroneal sublux ability or dislocation  Alignment: Forefoot, Midfoot, Hindfoot WNL. Neuro Motor/Sensory: NL/NL. Vascular: NL foot/ankle pulses. Lymphatics: No extremity lymphedema, No calf swelling, no tenderness to calf muscles. CHART REVIEW     Past Medical History:   Diagnosis Date    Kidney stone      Current Outpatient Medications   Medication Sig    ibuprofen (MOTRIN) 800 mg tablet Take 1 Tab by mouth three (3) times daily as needed for Pain.  ibuprofen (MOTRIN) 800 mg tablet Take 1 Tab by mouth three (3) times daily as needed for Pain.  multivitamin (ONE A DAY) tablet Take 1 Tab by mouth daily. No current facility-administered medications for this visit. No Known Allergies  Past Surgical History:   Procedure Laterality Date    REPAIR ING HERNIA,5+Y/O,REDUCIBL Left 03/02/2018    Dr. Lauralyn Simmonds Not on file   Tobacco Use    Smoking status: Never Smoker    Smokeless tobacco: Never Used   Substance and Sexual Activity    Alcohol use:  No  Drug use: No    Sexual activity: Not on file     Family History   Problem Relation Age of Onset    Diabetes Mother         REVIEW OF SYSTEMS : 7/22/2019  ALL BELOW ARE Negative except : SEE HPI     Constitutional: Negative for fever, chills and weight loss. Neg Weight Loss  Cardiovascular: Negative for chest pain, claudication and leg swelling. SOB, GAITAN   Gastrointestinal/Urological: Negative for  pain, N/V/D/C, Blood in stool or urine,dysuria                         Hematuria, Incontinence, pelvic pain  Musculoskeletal: see HPI. Neurological: Negative for dizziness and weakness, headaches,Visual Changes             Confusion,  Or Seizures,   Psychiatric/Behavioral: Negative for depression, memory loss and substance abuse. Extremities:  Negative for hair changes, rash or skin lesion changes. Hematologic: Negative for Bleeding problems, bruising, pallor or swollen lymph nodes. Peripheral Vascular: No calf pain, vascular vein tenderness to calf pain              No calf throbbing, posterior knee throbbing pain     DIAGNOSTIC IMAGING     No notes on file      Please see above section of this report. I have personally reviewed the results of the above study. The interpretation of this study is my professional opinion. Written by Alexandria Le, as dictated by Dr. Leanna Rodriguez. I, Dr. Leanna Rodriguez, confirm that all documentation is accurate.
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
## 2019-08-12 ENCOUNTER — OFFICE VISIT (OUTPATIENT)
Dept: ORTHOPEDIC SURGERY | Age: 49
End: 2019-08-12

## 2019-08-12 VITALS
DIASTOLIC BLOOD PRESSURE: 76 MMHG | TEMPERATURE: 97.5 F | RESPIRATION RATE: 13 BRPM | OXYGEN SATURATION: 93 % | HEART RATE: 95 BPM | HEIGHT: 66 IN | SYSTOLIC BLOOD PRESSURE: 135 MMHG | WEIGHT: 165.6 LBS | BODY MASS INDEX: 26.61 KG/M2

## 2019-08-12 DIAGNOSIS — S92.514D CLOSED NONDISPLACED FRACTURE OF PROXIMAL PHALANX OF LESSER TOE OF RIGHT FOOT WITH ROUTINE HEALING, SUBSEQUENT ENCOUNTER: Primary | ICD-10-CM

## 2019-08-12 NOTE — PROGRESS NOTES
AMBULATORY PROGRESS NOTE      Patient: Sandy Little             MRN: [id number]     SSN: [id number] Body mass index is 26.73 kg/m². YOB: 1970     AGE: 47 y.o. SEX: male    PCP: Luz Allen MD     IMPRESSION/DIAGNOSIS AND TREATMENT PLAN     DIAGNOSES  1. Closed nondisplaced fracture of proximal phalanx of lesser toe of right foot with routine healing, subsequent encounter        Orders Placed This Encounter    [[id number]] [address] 3V      Sandy Little understands his diagnoses and the proposed plan. He is doing well at this point having sustained a right third toe proximal phalangeal fracture. Clinically, he is nontender today. X-rays of his right foot, three views, AP, lateral, and oblique films, today, show excellent healing to this right third toe proximal phalangeal fracture without subluxation or dislocation and without malalignment. My plan is listed as below for him, as he is doing well. Plan:    1) Wear stiff, comfortable shoe as directed. 2) Continue adelaide taping the #2 and #3 toes. 3) Continue activity modification as directed. RTO - 4 weeks // PLEASE OBTAIN X-RAYS OF: right foot 3 VIEWS      HPI AND EXAMINATION     Sandy Little IS A 47 y.o. male who presents to my outpatient office for follow up of a closed nondisplaced fracture of the proximal phalanx of the lesser toe of the right foot. At the last visit, I prescribed Ibuprofen 800 mg, instructed the patient to continue activity modification as directed, and to continue adelaide taping the left #2 and #3 toes. Date of injury: 07/10/2019. Since we saw him last, Mr. Nichole Chun states that he is doing better. He notes that he has had to stop wearing the CAM walker boot, as it has been causing pain in his left knee. XR imaging has been reviewed with the patient. The patient works in security.      Visit Vitals  /76   Pulse 95   Temp 97.5 °F (36.4 °C) (Oral)   Resp 13   Ht 5' 6\" (1.676 m)   Wt 165 lb 9.6 oz (75.1 kg)   SpO2 93%   BMI 26.73 kg/m²     Appearance: Alert, well appearing and pleasant patient who is in no distress, oriented to person, place/time, and who follows commands. This patient is accompanied in the examination room by his self. Dementia: no dementia  Psychiatric: Affect and mood are appropriate. Patient arrives to office via: with assistive device: hard sole shoe  HEENT: Head normocephalic & atraumatic. Both pupils are round, non icteric sclera   Eye: EOM are intact and sclera are clear    Neck: ROM WNL and JVD neck is not present     Hearings Intact, does not require hearing aid device  Respiratory: Breathing is unlabored without accessory chest muscle use  Cardiovascular/Peripheral Vascular: Normal Pulses to each foot    ANKLE/FOOT right    Gait: in hard sole shoe. Tenderness: No tenderness to the 3rd proximal phalanx at this time   Cutaneous: Swelling to the 3rd proximal phalanx. Joint Motion: Not tested. Joint / Tendon Stability: No Ankle or Subtalar instability or joint laxity. No peroneal sublux ability or dislocation  Alignment: Forefoot, Midfoot, Hindfoot WNL. Neuro Motor/Sensory: NL/NL. Vascular: NL foot/ankle pulses. Lymphatics: No extremity lymphedema, No calf swelling, no tenderness to calf muscles. CHART REVIEW     Past Medical History:   Diagnosis Date    Kidney stone      Current Outpatient Medications   Medication Sig    ibuprofen (MOTRIN) 800 mg tablet Take 1 Tab by mouth three (3) times daily as needed for Pain.  ibuprofen (MOTRIN) 800 mg tablet Take 1 Tab by mouth three (3) times daily as needed for Pain.  multivitamin (ONE A DAY) tablet Take 1 Tab by mouth daily. No current facility-administered medications for this visit.       No Known Allergies  Past Surgical History:   Procedure Laterality Date    REPAIR ING HERNIA,5+Y/O,REDUCIBL Left 03/02/2018    Dr. Yeimy Lozano Not on file   Tobacco Use    Smoking status: Never Smoker    Smokeless tobacco: Never Used   Substance and Sexual Activity    Alcohol use: No    Drug use: No    Sexual activity: Not on file     Family History   Problem Relation Age of Onset    Diabetes Mother         REVIEW OF SYSTEMS : 8/12/2019  ALL BELOW ARE Negative except : SEE HPI     Constitutional: Negative for fever, chills and weight loss. Neg Weight Loss  Cardiovascular: Negative for chest pain, claudication and leg swelling. SOB, GAITAN   Gastrointestinal/Urological: Negative for  pain, N/V/D/C, Blood in stool or urine,dysuria                         Hematuria, Incontinence, pelvic pain  Musculoskeletal: see HPI. Neurological: Negative for dizziness and weakness, headaches,Visual Changes             Confusion,  Or Seizures,   Psychiatric/Behavioral: Negative for depression, memory loss and substance abuse. Extremities:  Negative for hair changes, rash or skin lesion changes. Hematologic: Negative for Bleeding problems, bruising, pallor or swollen lymph nodes. Peripheral Vascular: No calf pain, vascular vein tenderness to calf pain              No calf throbbing, posterior knee throbbing pain     DIAGNOSTIC IMAGING     No notes on file      Please see above section of this report. I have personally reviewed the results of the above study. The interpretation of this study is my professional opinion. Written by Marialuisa Andrew, as dictated by Dr. Meghan Guardado. I, Dr. Meghan Guardado, confirm that all documentation is accurate.

## 2019-08-12 NOTE — PATIENT INSTRUCTIONS
Broken Toe: Care Instructions Your Care Instructions You have broken (fractured) a bone in your toe. This kind of fracture does not need a special cast or brace. \"Adelaide-taping\" your broken toe to a healthy toe next to it is almost always enough to treat the problem and ease symptoms. The toe may take 4 weeks or more to heal. 
You heal best when you take good care of yourself. Eat a variety of healthy foods, and don't smoke. Follow-up care is a key part of your treatment and safety. Be sure to make and go to all appointments, and call your doctor if you are having problems. It's also a good idea to know your test results and keep a list of the medicines you take. How can you care for yourself at home? · Be safe with medicines. Take pain medicines exactly as directed. ? If the doctor gave you a prescription medicine for pain, take it as prescribed. ? If you are not taking a prescription pain medicine, ask your doctor if you can take an over-the-counter medicine. · If your toe is taped to the toe next to it, your doctor has shown you how to change the tape. Protect the skin by putting something soft, such as felt or foam, between your toes before you tape them together. Never tape the toes together skin-to-skin. Your broken toe may need to be adelaide-taped for 2 to 4 weeks to heal. 
· Rest and protect your toe. Do not walk on it until you can do so without too much pain. If the doctor has told you to use crutches, use them as instructed. · Put ice or a cold pack on your toe for 10 to 20 minutes at a time. Try to do this every 1 to 2 hours for the next 3 days (when you are awake) or until the swelling goes down. Put a thin cloth between the ice and your skin. · Prop up your foot on a pillow when you ice it or anytime you sit or lie down. Try to keep it above the level of your heart. This will help reduce swelling. · Make sure you go to your follow-up appointments.  Your doctor will need to check that your toe is healing right. When should you call for help? Call your doctor now or seek immediate medical care if: 
  · You have severe pain.  
  · Your toe is cool or pale or changes color.  
  · You have tingling, weakness, or numbness in your toe.  
 Watch closely for changes in your health, and be sure to contact your doctor if: 
  · Pain and swelling get worse.  
  · You are not getting better as expected. Where can you learn more? Go to http://james-edie.info/. Enter V480 in the search box to learn more about \"Broken Toe: Care Instructions. \" Current as of: September 20, 2018 Content Version: 12.1 © 5924-5759 [employer], [employer]. Care instructions adapted under license by [employer] (which disclaims liability or warranty for this information). If you have questions about a medical condition or this instruction, always ask your healthcare professional. [address] any warranty or liability for your use of this information.

## 2019-08-12 NOTE — PROGRESS NOTES
1. Have you been to the ER, urgent care clinic since your last visit? Hospitalized since your last visit? No    2. Have you seen or consulted any other health care providers outside of the [address] [place] [address] since your last visit? Include any pap smears or colon screening.  No

## 2019-10-07 ENCOUNTER — OFFICE VISIT (OUTPATIENT)
Dept: ORTHOPEDIC SURGERY | Age: 49
End: 2019-10-07

## 2019-10-07 VITALS
BODY MASS INDEX: 26.2 KG/M2 | HEART RATE: 61 BPM | RESPIRATION RATE: 16 BRPM | HEIGHT: 66 IN | SYSTOLIC BLOOD PRESSURE: 131 MMHG | OXYGEN SATURATION: 98 % | DIASTOLIC BLOOD PRESSURE: 84 MMHG | WEIGHT: 163 LBS | TEMPERATURE: 97.9 F

## 2019-10-07 DIAGNOSIS — S92.514D CLOSED NONDISPLACED FRACTURE OF PROXIMAL PHALANX OF LESSER TOE OF RIGHT FOOT WITH ROUTINE HEALING, SUBSEQUENT ENCOUNTER: Primary | ICD-10-CM

## 2019-10-07 NOTE — PROGRESS NOTES
AMBULATORY PROGRESS NOTE      Patient: Kelly Andersen             MRN: [id number]     SSN: [id number] Body mass index is 26.31 kg/m². YOB: 1970     AGE: 47 y.o. SEX: male    PCP: Kelli Gaspar MD     IMPRESSION/DIAGNOSIS AND TREATMENT PLAN     DIAGNOSES  1. Closed nondisplaced fracture of proximal phalanx of lesser toe of right foot with routine healing, subsequent encounter        Orders Placed This Encounter    [[id number]] [address] 3V      Kelly Andersen understands his diagnoses and the proposed plan. Plan:    1) Continue activities as tolerated. RTO - PRN  His x-rays show excellent healing to the right number three toe of the forefoot on this right side, the right third toe proximal phalanx. He is asymptomatic and nontender. Prn return to the office, as he is doing well. HPI AND EXAMINATION     Kelly Andersen IS A 47 y.o. male who presents to my outpatient office for follow up of a closed nondisplaced fracture of the proximal phalanx of the lesser toe of the right foot. At the last visit, I instructed the patient to continue activity modification as directed, to continue adelaide taping the #2 and #3 toes, and to wear stiff, comfortable shoes. Date of injury: 07/10/2019. Since we saw him last, Mr. Shaye Coyne states that he is doing well. He denies any pain or soreness at this time. XR imaging has been reviewed with the patient. He states that he is still working regularly. He presents to the office today wearing regular shoes. The patient works in security. Visit Vitals  /84   Pulse 61   Temp 97.9 °F (36.6 °C) (Oral)   Resp 16   Ht 5' 6\" (1.676 m)   Wt 163 lb (73.9 kg)   SpO2 98%   BMI 26.31 kg/m²     Appearance: Alert, well appearing and pleasant patient who is in no distress, oriented to person, place/time, and who follows commands. This patient is accompanied in the examination room by his self. Dementia: no dementia  Psychiatric: Affect and mood are appropriate.  Patient arrives to office via: without assistive device  HEENT: Head normocephalic & atraumatic. Both pupils are round, non icteric sclera   Eye: EOM are intact and sclera are clear    Neck: ROM WNL and JVD neck is not present     Hearings Intact, does not require hearing aid device  Respiratory: Breathing is unlabored without accessory chest muscle use  Cardiovascular/Peripheral Vascular: Normal Pulses to each foot    ANKLE/FOOT right    Gait: normal  Tenderness: No tenderness to the 3rd proximal phalanx at this time   Cutaneous: Mild swelling to the 3rd proximal phalanx. Joint Motion: Not tested. Joint / Tendon Stability: No Ankle or Subtalar instability or joint laxity. No peroneal sublux ability or dislocation  Alignment: Forefoot, Midfoot, Hindfoot WNL. Neuro Motor/Sensory: NL/NL. Vascular: NL foot/ankle pulses. Lymphatics: No extremity lymphedema, No calf swelling, no tenderness to calf muscles. CHART REVIEW     Past Medical History:   Diagnosis Date    Kidney stone      Current Outpatient Medications   Medication Sig    ibuprofen (MOTRIN) 800 mg tablet Take 1 Tab by mouth three (3) times daily as needed for Pain.  ibuprofen (MOTRIN) 800 mg tablet Take 1 Tab by mouth three (3) times daily as needed for Pain.  multivitamin (ONE A DAY) tablet Take 1 Tab by mouth daily. No current facility-administered medications for this visit.       No Known Allergies  Past Surgical History:   Procedure Laterality Date    REPAIR ING HERNIA,5+Y/O,REDUCIBL Left 03/02/2018    Dr. Mona Sánchez Not on file   Tobacco Use    Smoking status: Never Smoker    Smokeless tobacco: Never Used   Substance and Sexual Activity    Alcohol use: No    Drug use: No    Sexual activity: Not on file     Family History   Problem Relation Age of Onset    Diabetes Mother         REVIEW OF SYSTEMS : 10/7/2019  ALL BELOW ARE Negative except : SEE HPI     Constitutional: Negative for fever, chills and weight loss. Neg Weight Loss  Cardiovascular: Negative for chest pain, claudication and leg swelling. SOB, GAITAN   Gastrointestinal/Urological: Negative for  pain, N/V/D/C, Blood in stool or urine,dysuria                         Hematuria, Incontinence, pelvic pain  Musculoskeletal: see HPI. Neurological: Negative for dizziness and weakness, headaches,Visual Changes             Confusion,  Or Seizures,   Psychiatric/Behavioral: Negative for depression, memory loss and substance abuse. Extremities:  Negative for hair changes, rash or skin lesion changes. Hematologic: Negative for Bleeding problems, bruising, pallor or swollen lymph nodes. Peripheral Vascular: No calf pain, vascular vein tenderness to calf pain              No calf throbbing, posterior knee throbbing pain     DIAGNOSTIC IMAGING     No notes on file      Please see above section of this report. I have personally reviewed the results of the above study. The interpretation of this study is my professional opinion. Written by Rowdy Montes De Oca, as dictated by Dr. Jean Claude Logan. I, Dr. Jean Claude Logan, confirm that all documentation is accurate.

## 2019-10-07 NOTE — PATIENT INSTRUCTIONS
Broken Toe: Care Instructions  Your Care Instructions  You have broken (fractured) a bone in your toe. This kind of fracture does not need a special cast or brace. \"Adelaide-taping\" your broken toe to a healthy toe next to it is almost always enough to treat the problem and ease symptoms. The toe may take 4 weeks or more to heal.  You heal best when you take good care of yourself. Eat a variety of healthy foods, and don't smoke. Follow-up care is a key part of your treatment and safety. Be sure to make and go to all appointments, and call your doctor if you are having problems. It's also a good idea to know your test results and keep a list of the medicines you take. How can you care for yourself at home? · Be safe with medicines. Take pain medicines exactly as directed. ? If the doctor gave you a prescription medicine for pain, take it as prescribed. ? If you are not taking a prescription pain medicine, ask your doctor if you can take an over-the-counter medicine. · If your toe is taped to the toe next to it, your doctor has shown you how to change the tape. Protect the skin by putting something soft, such as felt or foam, between your toes before you tape them together. Never tape the toes together skin-to-skin. Your broken toe may need to be adelaide-taped for 2 to 4 weeks to heal.  · Rest and protect your toe. Do not walk on it until you can do so without too much pain. If the doctor has told you to use crutches, use them as instructed. · Put ice or a cold pack on your toe for 10 to 20 minutes at a time. Try to do this every 1 to 2 hours for the next 3 days (when you are awake) or until the swelling goes down. Put a thin cloth between the ice and your skin. · Prop up your foot on a pillow when you ice it or anytime you sit or lie down. Try to keep it above the level of your heart. This will help reduce swelling. · Make sure you go to your follow-up appointments.  Your doctor will need to check that your toe is healing right. When should you call for help? Call your doctor now or seek immediate medical care if:    · You have severe pain.     · Your toe is cool or pale or changes color.     · You have tingling, weakness, or numbness in your toe.    Watch closely for changes in your health, and be sure to contact your doctor if:    · Pain and swelling get worse.     · You are not getting better as expected. Where can you learn more? Go to http://james-edie.info/. Enter E425 in the search box to learn more about \"Broken Toe: Care Instructions. \"  Current as of: June 26, 2019  Content Version: 12.2  © 3835-4157 [employer], [employer]. Care instructions adapted under license by [employer] (which disclaims liability or warranty for this information). If you have questions about a medical condition or this instruction, always ask your healthcare professional. [address] any warranty or liability for your use of this information.

## 2019-10-09 ENCOUNTER — HOSPITAL ENCOUNTER (EMERGENCY)
Age: 49
Discharge: HOME | End: 2019-10-09
Attending: EMERGENCY MEDICINE
Payer: COMMERCIAL

## 2019-10-09 VITALS
TEMPERATURE: 98.3 F | RESPIRATION RATE: 16 BRPM | OXYGEN SATURATION: 98 % | HEART RATE: 78 BPM | WEIGHT: 167 LBS | DIASTOLIC BLOOD PRESSURE: 78 MMHG | BODY MASS INDEX: 26.95 KG/M2 | SYSTOLIC BLOOD PRESSURE: 132 MMHG

## 2019-10-09 DIAGNOSIS — L03.031 CELLULITIS OF TOE OF RIGHT FOOT: Primary | ICD-10-CM

## 2019-10-09 DIAGNOSIS — T63.304A SPIDER BITE WOUND, UNDETERMINED INTENT, INITIAL ENCOUNTER: ICD-10-CM

## 2019-10-09 DIAGNOSIS — L02.91 ABSCESS: ICD-10-CM

## 2019-10-09 PROCEDURE — 99281 EMR DPT VST MAYX REQ PHY/QHP: CPT

## 2019-10-09 RX ORDER — SULFAMETHOXAZOLE AND TRIMETHOPRIM 800; 160 MG/1; MG/1
1 TABLET ORAL 2 TIMES DAILY
Qty: 14 TAB | Refills: 0 | Status: SHIPPED | OUTPATIENT
Start: 2019-10-09 | End: 2019-10-16

## 2019-10-09 RX ORDER — CEPHALEXIN 500 MG/1
500 CAPSULE ORAL 4 TIMES DAILY
Qty: 28 CAP | Refills: 0 | Status: SHIPPED | OUTPATIENT
Start: 2019-10-09 | End: 2019-10-16

## 2019-10-09 RX ORDER — HYDROXYZINE 50 MG/1
50 TABLET, FILM COATED ORAL
Qty: 20 TAB | Refills: 0 | Status: SHIPPED | OUTPATIENT
Start: 2019-10-09 | End: 2019-10-19

## 2019-10-09 NOTE — ED PROVIDER NOTES
EMERGENCY DEPARTMENT HISTORY AND PHYSICAL EXAM    Date: 10/9/2019  Patient Name: Christina Yousif    History of Presenting Illness     Chief Complaint   Patient presents with   [address]     right foot         History Provided By: Patient    Chief Complaint: Insect bite  Duration: Days  Timing: Worsening  Location: Right third toe  Quality: Tender  Severity: Moderate  Modifying Factors: Worse after what he believes is a spider bite  Associated Symptoms: none       Additional History (Context): Christina Yousif is a 47 y.o. male with a history of kidney stones who presents today for concerns of spider bite to the right third toe. Patient reports erythema has worsened in the past few days. Patient has not tried anything for this at home. Denies any IV drug abuse, fevers or chills. PCP: Horacio Diop MD    Current Outpatient Medications   Medication Sig Dispense Refill    cephALEXin (KEFLEX) 500 mg capsule Take 1 Cap by mouth four (4) times daily for 7 days. 28 Cap 0    trimethoprim-sulfamethoxazole (BACTRIM DS) 160-800 mg per tablet Take 1 Tab by mouth two (2) times a day for 7 days. 14 Tab 0    hydrOXYzine HCl (ATARAX) 50 mg tablet Take 1 Tab by mouth every six (6) hours as needed for Itching for up to 10 days. 20 Tab 0    ibuprofen (MOTRIN) 800 mg tablet Take 1 Tab by mouth three (3) times daily as needed for Pain. 60 Tab 1    ibuprofen (MOTRIN) 800 mg tablet Take 1 Tab by mouth three (3) times daily as needed for Pain. 40 Tab 0    multivitamin (ONE A DAY) tablet Take 1 Tab by mouth daily.          Past History     Past Medical History:  Past Medical History:   Diagnosis Date    Kidney stone        Past Surgical History:  Past Surgical History:   Procedure Laterality Date    REPAIR ING HERNIA,5+Y/O,REDUCIBL Left 03/02/2018    Dr. Martin Burr       Family History:  Family History   Problem Relation Age of Onset    Diabetes Mother        Social History:  Social History     Tobacco Use    Smoking status: Never Smoker    Smokeless tobacco: Never Used   Substance Use Topics    Alcohol use: No    Drug use: No       Allergies:  No Known Allergies      Review of Systems   Review of Systems   Constitutional: Negative for chills and fever. HENT: Negative for congestion, rhinorrhea and sore throat. Respiratory: Negative for cough and shortness of breath. Cardiovascular: Negative for chest pain. Gastrointestinal: Negative for abdominal pain, blood in stool, constipation, diarrhea, nausea and vomiting. Genitourinary: Negative for dysuria, frequency and hematuria. Musculoskeletal: Negative for back pain and myalgias. Skin: Positive for color change. Negative for rash and wound. Neurological: Negative for dizziness and headaches. All other systems reviewed and are negative. All Other Systems Negative  Physical Exam     Vitals:    10/09/19 1250   BP: 132/78   Pulse: 78   Resp: 16   Temp: 98.3 °F (36.8 °C)   SpO2: 98%   Weight: 75.8 kg (167 lb)     Physical Exam   Constitutional: He is oriented to person, place, and time. He appears well-developed and well-nourished. No distress. HENT:   Head: Normocephalic and atraumatic. Eyes: Conjunctivae are normal.   Neck: Normal range of motion. Neck supple. Cardiovascular: Normal rate, regular rhythm and normal heart sounds. Pulmonary/Chest: Effort normal and breath sounds normal. No respiratory distress. He exhibits no tenderness. Abdominal: Soft. Bowel sounds are normal. He exhibits no distension. There is no tenderness. There is no rebound and no guarding. Musculoskeletal: Normal range of motion. He exhibits no edema or deformity. Neurological: He is alert and oriented to person, place, and time. Skin: Skin is warm and dry. He is not diaphoretic. There is erythema. Right 3rd digit erythema and drainage from abscess, surrounding cellulitis noted. Neurovascularly intact    Psychiatric: He has a normal mood and affect.    Nursing note and vitals reviewed. Diagnostic Study Results     Labs -   No results found for this or any previous visit (from the past 12 hour(s)). Radiologic Studies -   No orders to display     CT Results  (Last 48 hours)    None        CXR Results  (Last 48 hours)    None            Medical Decision Making   I am the first provider for this patient. I reviewed the vital signs, available nursing notes, past medical history, past surgical history, family history and social history. Vital Signs-Reviewed the patient's vital signs. Records Reviewed: Nursing Notes and Old Medical Records     Procedures: None   Procedures    Provider Notes (Medical Decision Making):     Differential: Cellulitis, abscess, spider bite, insect bite    Plan: Abscess on right third toe has drained completely on its own. Surrounding erythema and cellulitis noted. Will place on 2 antibiotics and hydroxyzine. Have discussed at length the importance of close observation of this toe and to report back immediately for wound check as needed. Patient agrees with the plan and management and states all questions have been thoroughly answered and there are no more remaining questions. MED RECONCILIATION:  No current facility-administered medications for this encounter. Current Outpatient Medications   Medication Sig    cephALEXin (KEFLEX) 500 mg capsule Take 1 Cap by mouth four (4) times daily for 7 days.  trimethoprim-sulfamethoxazole (BACTRIM DS) 160-800 mg per tablet Take 1 Tab by mouth two (2) times a day for 7 days.  hydrOXYzine HCl (ATARAX) 50 mg tablet Take 1 Tab by mouth every six (6) hours as needed for Itching for up to 10 days.  ibuprofen (MOTRIN) 800 mg tablet Take 1 Tab by mouth three (3) times daily as needed for Pain.  ibuprofen (MOTRIN) 800 mg tablet Take 1 Tab by mouth three (3) times daily as needed for Pain.  multivitamin (ONE A DAY) tablet Take 1 Tab by mouth daily.        Disposition:  Home DISCHARGE NOTE:   Pt has been reexamined. Patient has no new complaints, changes, or physical findings. Care plan outlined and precautions discussed. Results of workup were reviewed with the patient. All medications were reviewed with the patient. All of pt's questions and concerns were addressed. Patient was instructed and agrees to follow up with PCP as well as to return to the ED upon further deterioration. Patient is ready to go home. Follow-up Information     Follow up With Specialties Details Why Contact Info    [facility] EMERGENCY DEPT Emergency Medicine  As needed, For wound re-check [address] [place] [address] [address] [facility]    Haig Kocher, MD Family Practice In 2 days  [address] [facility]  [phone]            Current Discharge Medication List      START taking these medications    Details   cephALEXin (KEFLEX) 500 mg capsule Take 1 Cap by mouth four (4) times daily for 7 days. Qty: 28 Cap, Refills: 0      trimethoprim-sulfamethoxazole (BACTRIM DS) 160-800 mg per tablet Take 1 Tab by mouth two (2) times a day for 7 days. Qty: 14 Tab, Refills: 0      hydrOXYzine HCl (ATARAX) 50 mg tablet Take 1 Tab by mouth every six (6) hours as needed for Itching for up to 10 days. Qty: 20 Tab, Refills: 0                 Diagnosis     Clinical Impression:   1. Cellulitis of toe of right foot    2. Abscess    3.  Spider bite wound, undetermined intent, initial encounter

## 2019-10-09 NOTE — ED TRIAGE NOTES
\"I think I got a spider bite; it started itching yesterday and now it's red and swollen. \" Observed redness and swelling along with blister to 3rd toe on right foot.

## 2019-10-09 NOTE — DISCHARGE INSTRUCTIONS
Patient Education        Cellulitis: Care Instructions  Your Care Instructions    Cellulitis is a skin infection caused by bacteria, most often strep or staph. It often occurs after a break in the skin from a scrape, cut, bite, or puncture, or after a rash. Cellulitis may be treated without doing tests to find out what caused it. But your doctor may do tests, if needed, to look for a specific bacteria, like methicillin-resistant Staphylococcus aureus (MRSA). The doctor has checked you carefully, but problems can develop later. If you notice any problems or new symptoms, get medical treatment right away. Follow-up care is a key part of your treatment and safety. Be sure to make and go to all appointments, and call your doctor if you are having problems. It's also a good idea to know your test results and keep a list of the medicines you take. How can you care for yourself at home? · Take your antibiotics as directed. Do not stop taking them just because you feel better. You need to take the full course of antibiotics. · Prop up the infected area on pillows to reduce pain and swelling. Try to keep the area above the level of your heart as often as you can. · If your doctor told you how to care for your wound, follow your doctor's instructions. If you did not get instructions, follow this general advice:  ? Wash the wound with clean water 2 times a day. Don't use hydrogen peroxide or alcohol, which can slow healing. ? You may cover the wound with a thin layer of petroleum jelly, such as Vaseline, and a nonstick bandage. ? Apply more petroleum jelly and replace the bandage as needed. · Be safe with medicines. Take pain medicines exactly as directed. ? If the doctor gave you a prescription medicine for pain, take it as prescribed. ? If you are not taking a prescription pain medicine, ask your doctor if you can take an over-the-counter medicine.   To prevent cellulitis in the future  · Try to prevent cuts, scrapes, or other injuries to your skin. Cellulitis most often occurs where there is a break in the skin. · If you get a scrape, cut, mild burn, or bite, wash the wound with clean water as soon as you can to help avoid infection. Don't use hydrogen peroxide or alcohol, which can slow healing. · If you have swelling in your legs (edema), support stockings and good skin care may help prevent leg sores and cellulitis. · Take care of your feet, especially if you have diabetes or other conditions that increase the risk of infection. Wear shoes and socks. Do not go barefoot. If you have athlete's foot or other skin problems on your feet, talk to your doctor about how to treat them. When should you call for help? Call your doctor now or seek immediate medical care if:    · You have signs that your infection is getting worse, such as:  ? Increased pain, swelling, warmth, or redness. ? Red streaks leading from the area. ? Pus draining from the area. ? A fever.     · You get a rash.    Watch closely for changes in your health, and be sure to contact your doctor if:    · You do not get better as expected. Where can you learn more? Go to http://james-edie.info/. Ivette Longoria in the search box to learn more about \"Cellulitis: Care Instructions. \"  Current as of: April 1, 2019  Content Version: 12.2  © 6985-8925 [employer]. Care instructions adapted under license by [employer] (which disclaims liability or warranty for this information). If you have questions about a medical condition or this instruction, always ask your healthcare professional. [place] [address] any warranty or liability for your use of this information. Patient Education        Skin Abscess: Care Instructions  Your Care Instructions    A skin abscess is a bacterial infection that forms a pocket of pus. A boil is a kind of skin abscess.  The doctor may have cut an opening in the abscess so that the pus can drain out. You may have gauze in the cut so that the abscess will stay open and keep draining. You may need antibiotics. You will need to follow up with your doctor to make sure the infection has gone away. The doctor has checked you carefully, but problems can develop later. If you notice any problems or new symptoms, get medical treatment right away. Follow-up care is a key part of your treatment and safety. Be sure to make and go to all appointments, and call your doctor if you are having problems. It's also a good idea to know your test results and keep a list of the medicines you take. How can you care for yourself at home? · Apply warm and dry compresses, a heating pad set on low, or a hot water bottle 3 or 4 times a day for pain. Keep a cloth between the heat source and your skin. · If your doctor prescribed antibiotics, take them as directed. Do not stop taking them just because you feel better. You need to take the full course of antibiotics. · Take pain medicines exactly as directed. ? If the doctor gave you a prescription medicine for pain, take it as prescribed. ? If you are not taking a prescription pain medicine, ask your doctor if you can take an over-the-counter medicine. · Keep your bandage clean and dry. Change the bandage whenever it gets wet or dirty, or at least one time a day. · If the abscess was packed with gauze:  ? Keep follow-up appointments to have the gauze changed or removed. If the doctor instructed you to remove the gauze, follow the instructions you were given for how to remove it. ? After the gauze is removed, soak the area in warm water for 15 to 20 minutes 2 times a day, until the wound closes. When should you call for help? Call your doctor now or seek immediate medical care if:    · You have signs of worsening infection, such as:  ? Increased pain, swelling, warmth, or redness. ? Red streaks leading from the infected skin. ?  Pus draining from the wound. ? A fever.    Watch closely for changes in your health, and be sure to contact your doctor if:    · You do not get better as expected. Where can you learn more? Go to http://james-edie.info/. Enter B520 in the search box to learn more about \"Skin Abscess: Care Instructions. \"  Current as of: April 1, 2019  Content Version: 12.2  © 1166-1344 [employer]. Care instructions adapted under license by [employer] (which disclaims liability or warranty for this information). If you have questions about a medical condition or this instruction, always ask your healthcare professional. [place] [address] any warranty or liability for your use of this information. Patient Education        Spider Bite or Scorpion Sting: Care Instructions  Your Care Instructions    Spider bites and scorpion stings often cause minor swelling, redness, pain, and itching. These mild symptoms are common and may last from a few hours to a few days. Some people have more severe reactions. Home treatment is often all that you need to relieve symptoms. Follow-up care is a key part of your treatment and safety. Be sure to make and go to all appointments, and call your doctor if you are having problems. It's also a good idea to know your test results and keep a list of the medicines you take. How can you care for yourself at home? · Put ice or a cold pack on the area for 10 to 20 minutes at a time. Put a thin cloth between the ice and your skin. · Try an over-the-counter medicine for itching, redness, swelling, and pain. Read and follow all instructions on the label. ? Take an antihistamine, such as diphenhydramine (Benadryl) or loratadine (Claritin). ? Put a hydrocortisone 1% cream or calamine lotion on the skin. · Don't scratch or rub the skin around the area. When should you call for help? Call 911 anytime you think you may need emergency care. For example, call if:    · You passed out (lost consciousness).     · You have a seizure.     · You have trouble breathing.    Call your doctor now or seek immediate medical care if:    · You have signs of infection, such as:  ? Increased pain, swelling, warmth, or redness around the bite or sting. ? Red streaks leading from the area. ? Pus draining from the area. ? A fever.     · You get a blister or sore at the bite or sting area, or the area turns purple.    Watch closely for changes in your health, and be sure to contact your doctor if:    · You have pain or burning at the area after 2 days of home treatment.     · You have symptoms for more than 1 week. Where can you learn more? Go to http://james-edie.info/. Enter P595 in the search box to learn more about \"Spider Bite or Scorpion Sting: Care Instructions. \"  Current as of: June 26, 2019  Content Version: 12.2  © 0414-0547 [employer]. Care instructions adapted under license by [employer] (which disclaims liability or warranty for this information). If you have questions about a medical condition or this instruction, always ask your healthcare professional. [place] [address] any warranty or liability for your use of this information.

## 2020-06-05 NOTE — DISCHARGE INSTRUCTIONS
[facility]  Regino Todd MD, FACS  General Surgery    Pt may shower. Allow soap and water to run over the incision. Please apply an ice pack to the incision for 30 minutes three times daily for 7 days. Wear you most supportive underwear. No driving or operating heavy machinery while on narcotic pain medications. No strenuous activity or contact sports for two weeks. No lifting greater than 15 lbs for 2 weeks. Call MD for any redness, swelling, bleeding or pus at the incision. Also call for any nausea, vomiting, increased pain or pain uncontrolled by pain medicine. Hernia Repair: What to Expect at [address] are likely to have pain for the next few days. You may also feel like you have the flu, and you may have a low fever and feel tired and nauseated. This is common. You should feel better after a few days and will probably feel much better in 7 days. For several weeks you may feel twinges or pulling in the hernia repair when you move. You may have some bruising on the scrotum and along the penis. This is normal. Men will need to wear a jockstrap or briefs, not boxers, for scrotal support for several days after a groin (inguinal) hernia repair. [employer] bicycle shorts may provide good support. This care sheet gives you a general idea about how long it will take for you to recover. But each person recovers at a different pace. Follow the steps below to get better as quickly as possible. How can you care for yourself at home? Activity  ? · Rest when you feel tired. Getting enough sleep will help you recover. ? · Try to walk each day. Start by walking a little more than you did the day before. Bit by bit, increase the amount you walk. Walking boosts blood flow and helps prevent pneumonia and constipation. ? · Avoid strenuous activities, such as biking, jogging, weight lifting, or aerobic exercise, until your doctor says it is okay.    ? · Avoid Detail Level: Detailed lifting anything that would make you strain. This may include heavy grocery bags and milk containers, a heavy briefcase or backpack, cat litter or dog food bags, a vacuum , or a child. ? · You may drive when you are no longer taking pain medicine and can quickly move your foot from the gas pedal to the brake. You must also be able to sit comfortably for a long period of time, even if you do not plan to go far. You might get caught in traffic. ? · Most people are able to return to work within 1 to 2 weeks after surgery. ? · You may shower 24 to 48 hours after surgery, if your doctor okays it. Pat the cut (incision) dry. Do not take a bath for the first 2 weeks, or until your doctor tells you it is okay. ? · Your doctor will tell you when you can have sex again. Diet  ? · You can eat your normal diet. If your stomach is upset, try bland, low-fat foods like plain rice, broiled chicken, toast, and yogurt. ? · Drink plenty of fluids (unless your doctor tells you not to). ? · You may notice that your bowel movements are not regular right after your surgery. This is common. Avoid constipation and straining with bowel movements. You may want to take a fiber supplement every day. If you have not had a bowel movement after a couple of days, ask your doctor about taking a mild laxative. Medicines  ? · Your doctor will tell you if and when you can restart your medicines. He or she will also give you instructions about taking any new medicines. ? · If you take blood thinners, such as warfarin (Coumadin), clopidogrel (Plavix), or aspirin, be sure to talk to your doctor. He or she will tell you if and when to start taking those medicines again. Make sure that you understand exactly what your doctor wants you to do. ? · Be safe with medicines. Take pain medicines exactly as directed. ¨ If the doctor gave you a prescription medicine for pain, take it as prescribed.   ¨ If you are not taking a prescription pain medicine, take an over-the-counter medicine such as acetaminophen (Tylenol), ibuprofen (Advil, Motrin), or naproxen (Aleve). Read and follow all instructions on the label. ¨ Do not take two or more pain medicines at the same time unless the doctor told you to. Many pain medicines have acetaminophen, which is Tylenol. Too much acetaminophen (Tylenol) can be harmful. ? · If your doctor prescribed antibiotics, take them as directed. Do not stop taking them just because you feel better. You need to take the full course of antibiotics. ? · If you think your pain medicine is making you sick to your stomach:  ¨ Take your medicine after meals (unless your doctor has told you not to). ¨ Ask your doctor for a different pain medicine. Incision care  ? · If you have strips of tape on the cut (incision) the doctor made, leave the tape on for a week or until it falls off.   ? · If you have staples closing the cut, you will need to visit your doctor in 1 to 2 weeks to have them removed. ? · Wash the area daily with warm, soapy water and pat it dry. Follow-up care is a key part of your treatment and safety. Be sure to make and go to all appointments, and call your doctor if you are having problems. It's also a good idea to know your test results and keep a list of the medicines you take. When should you call for help? Call 911 anytime you think you may need emergency care. For example, call if:  ? · You passed out (lost consciousness). ? · You are short of breath. ?Call your doctor now or seek immediate medical care if:  ? · You have pain that does not get better after you take pain medicine. ? · You are sick to your stomach and cannot keep fluids down. ? · You have signs of a blood clot in your leg (called a deep vein thrombosis), such as:  ¨ Pain in your calf, back of the knee, thigh, or groin. ¨ Redness and swelling in your leg or groin. ? · You cannot pass stools or gas.    ? · Bright red blood has soaked through the bandage over your incision. ? · You have loose stitches, or your incision comes open. ? · You have signs of infection, such as:  ¨ Increased pain, swelling, warmth, or redness. ¨ Red streaks leading from the incision. ¨ Pus draining from the incision. ¨ A fever. ? Watch closely for any changes in your health, and be sure to contact your doctor if you have any problems. Where can you learn more? Go to http://james-edie.info/. Enter L010 in the search box to learn more about \"Hernia Repair: What to Expect at Home. \"  Current as of: May 12, 2017  Content Version: 11.4  © 8676-5925 [employer]. Care instructions adapted under license by [employer] (which disclaims liability or warranty for this information). If you have questions about a medical condition or this instruction, always ask your healthcare professional. [place] [address] any warranty or liability for your use of this information. DISCHARGE SUMMARY from Nurse    PATIENT INSTRUCTIONS:    After general anesthesia or intravenous sedation, for 24 hours or while taking prescription Narcotics:  · Limit your activities  · Do not drive and operate hazardous machinery  · Do not make important personal or business decisions  · Do  not drink alcoholic beverages  · If you have not urinated within 8 hours after discharge, please contact your surgeon on call. Report the following to your surgeon:  · Excessive pain, swelling, redness or odor of or around the surgical area  · Temperature over 100.5  · Nausea and vomiting lasting longer than 4 hours or if unable to take medications  · Any signs of decreased circulation or nerve impairment to extremity: change in color, persistent  numbness, tingling, coldness or increase pain  · Any questions    What to do at Home    *  Please give a list of your current medications to your Primary Care Provider.     *  Please update this list whenever your medications are discontinued, doses are      changed, or new medications (including over-the-counter products) are added. *  Please carry medication information at all times in case of emergency situations. These are general instructions for a healthy lifestyle:    No smoking/ No tobacco products/ Avoid exposure to second hand smoke  Surgeon General's Warning:  Quitting smoking now greatly reduces serious risk to your health. Obesity, smoking, and sedentary lifestyle greatly increases your risk for illness    A healthy diet, regular physical exercise & weight monitoring are important for maintaining a healthy lifestyle    You may be retaining fluid if you have a history of heart failure or if you experience any of the following symptoms:  Weight gain of 3 pounds or more overnight or 5 pounds in a week, increased swelling in our hands or feet or shortness of breath while lying flat in bed. Please call your doctor as soon as you notice any of these symptoms; do not wait until your next office visit. Recognize signs and symptoms of STROKE:    F-face looks uneven    A-arms unable to move or move unevenly    S-speech slurred or non-existent    T-time-call 911 as soon as signs and symptoms begin-DO NOT go       Back to bed or wait to see if you get better-TIME IS BRAIN. Warning Signs of HEART ATTACK     Call 911 if you have these symptoms:   Chest discomfort. Most heart attacks involve discomfort in the center of the chest that lasts more than a few minutes, or that goes away and comes back. It can feel like uncomfortable pressure, squeezing, fullness, or pain.  Discomfort in other areas of the upper body. Symptoms can include pain or discomfort in one or both arms, the back, neck, jaw, or stomach.  Shortness of breath with or without chest discomfort.  Other signs may include breaking out in a cold sweat, nausea, or lightheadedness.   Don't wait more than five minutes to call 911 - Detail Level: Simple MINUTES MATTER! Fast action can save your life. Calling 911 is almost always the fastest way to get lifesaving treatment. Emergency Medical Services staff can begin treatment when they arrive -- up to an hour sooner than if someone gets to the hospital by car. The discharge information has been reviewed with the patient and spouse. The patient and spouse verbalized understanding. Discharge medications reviewed with the patient and spouse and appropriate educational materials and side effects teaching were provided.   ___________________________________________________________________________________________________________________________________

## 2021-01-09 ENCOUNTER — IMPORTED ENCOUNTER (OUTPATIENT)
Dept: URBAN - METROPOLITAN AREA CLINIC 1 | Facility: CLINIC | Age: 51
End: 2021-01-09

## 2021-01-09 PROBLEM — H52.13: Noted: 2021-01-09

## 2021-01-09 PROBLEM — H52.4: Noted: 2021-01-09

## 2021-01-09 PROCEDURE — S0620 ROUTINE OPHTHALMOLOGICAL EXA: HCPCS

## 2021-01-09 NOTE — PATIENT DISCUSSION
1. Myopia: Rx was given for correction if indicated and requested. 2. Presbyopia 3. Cataract OU - ObserveReturn for an appointment in 1 year 36 with Dr. Concetta Erazo.

## 2022-02-05 ENCOUNTER — IMPORTED ENCOUNTER (OUTPATIENT)
Dept: URBAN - METROPOLITAN AREA CLINIC 1 | Facility: CLINIC | Age: 52
End: 2022-02-05

## 2022-02-05 PROBLEM — H52.4: Noted: 2022-02-05

## 2022-02-05 PROBLEM — H52.222: Noted: 2022-02-05

## 2022-02-05 PROBLEM — H52.13: Noted: 2022-02-05

## 2022-02-05 PROCEDURE — S0621 ROUTINE OPHTHALMOLOGICAL EXA: HCPCS

## 2022-02-05 NOTE — PATIENT DISCUSSION
1. Myopia w/ Astigmatism OS - Rx was given for correction if indicated and requested. 2. Presbyopia 3. Cataract OU Return for an appointment in 1 year for 40 with Dr. Price.

## 2022-03-19 PROBLEM — K40.90 LEFT INGUINAL HERNIA: Status: ACTIVE | Noted: 2018-03-02

## 2022-04-08 ASSESSMENT — VISUAL ACUITY
OS_SC: J5
OS_CC: J1
OS_SC: 20/20
OD_SC: 20/20
OD_SC: 20/20
OD_SC: J5
OD_CC: J1
OS_SC: 20/20

## 2022-04-08 ASSESSMENT — TONOMETRY
OS_IOP_MMHG: 18
OD_IOP_MMHG: 18
OD_IOP_MMHG: 18
OS_IOP_MMHG: 18

## 2023-07-24 ENCOUNTER — APPOINTMENT (OUTPATIENT)
Facility: HOSPITAL | Age: 53
End: 2023-07-24

## 2023-07-24 ENCOUNTER — HOSPITAL ENCOUNTER (EMERGENCY)
Facility: HOSPITAL | Age: 53
Discharge: HOME | End: 2023-07-24
Attending: STUDENT IN AN ORGANIZED HEALTH CARE EDUCATION/TRAINING PROGRAM

## 2023-07-24 VITALS
RESPIRATION RATE: 17 BRPM | TEMPERATURE: 98.7 F | SYSTOLIC BLOOD PRESSURE: 144 MMHG | WEIGHT: 170 LBS | HEIGHT: 66 IN | DIASTOLIC BLOOD PRESSURE: 95 MMHG | OXYGEN SATURATION: 97 % | BODY MASS INDEX: 27.32 KG/M2 | HEART RATE: 64 BPM

## 2023-07-24 DIAGNOSIS — R07.89 CHEST WALL PAIN: Primary | ICD-10-CM

## 2023-07-24 LAB
ALBUMIN SERPL-MCNC: 3.8 G/DL (ref 3.4–5)
ALBUMIN/GLOB SERPL: 1.2 (ref 0.8–1.7)
ALP SERPL-CCNC: 53 U/L (ref 45–117)
ALT SERPL-CCNC: 20 U/L (ref 16–61)
ANION GAP SERPL CALC-SCNC: 5 MMOL/L (ref 3–18)
AST SERPL-CCNC: 16 U/L (ref 10–38)
BASOPHILS # BLD: 0 K/UL (ref 0–0.1)
BASOPHILS NFR BLD: 1 % (ref 0–2)
BILIRUB SERPL-MCNC: 1.3 MG/DL (ref 0.2–1)
BUN SERPL-MCNC: 13 MG/DL (ref 7–18)
BUN/CREAT SERPL: 10 (ref 12–20)
CALCIUM SERPL-MCNC: 9.1 MG/DL (ref 8.5–10.1)
CHLORIDE SERPL-SCNC: 109 MMOL/L (ref 100–111)
CO2 SERPL-SCNC: 26 MMOL/L (ref 21–32)
CREAT SERPL-MCNC: 1.36 MG/DL (ref 0.6–1.3)
DIFFERENTIAL METHOD BLD: NORMAL
EOSINOPHIL # BLD: 0.1 K/UL (ref 0–0.4)
EOSINOPHIL NFR BLD: 3 % (ref 0–5)
ERYTHROCYTE [DISTWIDTH] IN BLOOD BY AUTOMATED COUNT: 12.5 % (ref 11.6–14.5)
GLOBULIN SER CALC-MCNC: 3.1 G/DL (ref 2–4)
GLUCOSE SERPL-MCNC: 130 MG/DL (ref 74–99)
HCT VFR BLD AUTO: 43.5 % (ref 36–48)
HGB BLD-MCNC: 14.9 G/DL (ref 13–16)
IMM GRANULOCYTES # BLD AUTO: 0 K/UL (ref 0–0.04)
IMM GRANULOCYTES NFR BLD AUTO: 0 % (ref 0–0.5)
LYMPHOCYTES # BLD: 1.5 K/UL (ref 0.9–3.6)
LYMPHOCYTES NFR BLD: 32 % (ref 21–52)
MCH RBC QN AUTO: 30 PG (ref 24–34)
MCHC RBC AUTO-ENTMCNC: 34.3 G/DL (ref 31–37)
MCV RBC AUTO: 87.7 FL (ref 78–100)
MONOCYTES # BLD: 0.5 K/UL (ref 0.05–1.2)
MONOCYTES NFR BLD: 10 % (ref 3–10)
NEUTS SEG # BLD: 2.5 K/UL (ref 1.8–8)
NEUTS SEG NFR BLD: 54 % (ref 40–73)
NRBC # BLD: 0 K/UL (ref 0–0.01)
NRBC BLD-RTO: 0 PER 100 WBC
PLATELET # BLD AUTO: 250 K/UL (ref 135–420)
PMV BLD AUTO: 9.2 FL (ref 9.2–11.8)
POTASSIUM SERPL-SCNC: 3.7 MMOL/L (ref 3.5–5.5)
PROT SERPL-MCNC: 6.9 G/DL (ref 6.4–8.2)
RBC # BLD AUTO: 4.96 M/UL (ref 4.35–5.65)
SODIUM SERPL-SCNC: 140 MMOL/L (ref 136–145)
TROPONIN I SERPL HS-MCNC: 31 NG/L (ref 0–78)
WBC # BLD AUTO: 4.7 K/UL (ref 4.6–13.2)

## 2023-07-24 PROCEDURE — 80053 COMPREHEN METABOLIC PANEL: CPT

## 2023-07-24 PROCEDURE — 93005 ELECTROCARDIOGRAM TRACING: CPT | Performed by: STUDENT IN AN ORGANIZED HEALTH CARE EDUCATION/TRAINING PROGRAM

## 2023-07-24 PROCEDURE — 99285 EMERGENCY DEPT VISIT HI MDM: CPT

## 2023-07-24 PROCEDURE — 84484 ASSAY OF TROPONIN QUANT: CPT

## 2023-07-24 PROCEDURE — 71046 X-RAY EXAM CHEST 2 VIEWS: CPT

## 2023-07-24 PROCEDURE — 85025 COMPLETE CBC W/AUTO DIFF WBC: CPT

## 2023-07-24 ASSESSMENT — LIFESTYLE VARIABLES
HOW MANY STANDARD DRINKS CONTAINING ALCOHOL DO YOU HAVE ON A TYPICAL DAY: 1 OR 2
HOW OFTEN DO YOU HAVE A DRINK CONTAINING ALCOHOL: MONTHLY OR LESS

## 2023-07-25 LAB
EKG ATRIAL RATE: 72 BPM
EKG DIAGNOSIS: NORMAL
EKG P AXIS: 39 DEGREES
EKG P-R INTERVAL: 124 MS
EKG Q-T INTERVAL: 392 MS
EKG QRS DURATION: 84 MS
EKG QTC CALCULATION (BAZETT): 429 MS
EKG R AXIS: 31 DEGREES
EKG T AXIS: 22 DEGREES
EKG VENTRICULAR RATE: 72 BPM

## 2023-07-25 PROCEDURE — 93010 ELECTROCARDIOGRAM REPORT: CPT | Performed by: INTERNAL MEDICINE

## 2023-07-26 ENCOUNTER — HOSPITAL ENCOUNTER (EMERGENCY)
Facility: HOSPITAL | Age: 53
Discharge: LEFT BEFORE DISCHARGE | End: 2023-07-26

## 2024-05-24 ENCOUNTER — COMPREHENSIVE EXAM (OUTPATIENT)
Dept: URBAN - METROPOLITAN AREA CLINIC 1 | Facility: CLINIC | Age: 54
End: 2024-05-24

## 2024-05-24 DIAGNOSIS — H25.13: ICD-10-CM

## 2024-05-24 PROCEDURE — 92015 DETERMINE REFRACTIVE STATE: CPT

## 2024-05-24 PROCEDURE — 99214 OFFICE O/P EST MOD 30 MIN: CPT

## 2024-05-24 ASSESSMENT — VISUAL ACUITY
OS_BAT: 20/30
OS_CC: J1+
OS_CC: 20/20
OD_CC: J1+
OD_CC: 20/20
OD_BAT: 20/30

## 2024-05-24 ASSESSMENT — TONOMETRY
OD_IOP_MMHG: 17
OS_IOP_MMHG: 16

## 2024-10-29 ENCOUNTER — HOSPITAL ENCOUNTER (EMERGENCY)
Facility: HOSPITAL | Age: 54
Discharge: HOME | End: 2024-10-29
Attending: EMERGENCY MEDICINE
Payer: COMMERCIAL

## 2024-10-29 VITALS
TEMPERATURE: 97.8 F | HEIGHT: 66 IN | BODY MASS INDEX: 27.9 KG/M2 | DIASTOLIC BLOOD PRESSURE: 95 MMHG | WEIGHT: 173.6 LBS | OXYGEN SATURATION: 100 % | SYSTOLIC BLOOD PRESSURE: 131 MMHG | RESPIRATION RATE: 20 BRPM | HEART RATE: 74 BPM

## 2024-10-29 DIAGNOSIS — T15.92XA FOREIGN BODY OF LEFT EXTERNAL EYE, INITIAL ENCOUNTER: Primary | ICD-10-CM

## 2024-10-29 PROCEDURE — 6370000000 HC RX 637 (ALT 250 FOR IP): Performed by: EMERGENCY MEDICINE

## 2024-10-29 PROCEDURE — 6370000000 HC RX 637 (ALT 250 FOR IP)

## 2024-10-29 PROCEDURE — 99283 EMERGENCY DEPT VISIT LOW MDM: CPT

## 2024-10-29 RX ORDER — ERYTHROMYCIN 5 MG/G
OINTMENT OPHTHALMIC
Qty: 1 G | Refills: 0 | Status: SHIPPED | OUTPATIENT
Start: 2024-10-29

## 2024-10-29 RX ORDER — CLOPIDOGREL BISULFATE 75 MG/1
75 TABLET ORAL DAILY
COMMUNITY

## 2024-10-29 RX ORDER — FAMOTIDINE 40 MG/1
40 TABLET, FILM COATED ORAL DAILY
COMMUNITY

## 2024-10-29 RX ORDER — TETRACAINE HYDROCHLORIDE 5 MG/ML
1 SOLUTION OPHTHALMIC
Status: COMPLETED | OUTPATIENT
Start: 2024-10-29 | End: 2024-10-29

## 2024-10-29 RX ORDER — ERYTHROMYCIN 5 MG/G
OINTMENT OPHTHALMIC ONCE
Status: COMPLETED | OUTPATIENT
Start: 2024-10-29 | End: 2024-10-29

## 2024-10-29 RX ORDER — METOPROLOL TARTRATE 25 MG/1
25 TABLET, FILM COATED ORAL 2 TIMES DAILY
COMMUNITY

## 2024-10-29 RX ORDER — SOFT LENS RINSE,STORE SOLUTION
SOLUTION, NON-ORAL MISCELLANEOUS
Status: COMPLETED
Start: 2024-10-29 | End: 2024-10-29

## 2024-10-29 RX ORDER — ASPIRIN 81 MG/1
81 TABLET ORAL DAILY
COMMUNITY
Start: 2024-04-22

## 2024-10-29 RX ORDER — SOFT LENS RINSE,STORE SOLUTION
1 SOLUTION, NON-ORAL MISCELLANEOUS PRN
Status: DISCONTINUED | OUTPATIENT
Start: 2024-10-29 | End: 2024-10-29 | Stop reason: HOSPADM

## 2024-10-29 RX ORDER — ATORVASTATIN CALCIUM 40 MG/1
40 TABLET, FILM COATED ORAL
COMMUNITY

## 2024-10-29 RX ADMIN — FLUORESCEIN SODIUM 1 MG: 1 STRIP OPHTHALMIC at 19:40

## 2024-10-29 RX ADMIN — TETRACAINE HYDROCHLORIDE 1 DROP: 5 SOLUTION OPHTHALMIC at 19:40

## 2024-10-29 RX ADMIN — WATER, PURIFIED: 99.05 LIQUID OPHTHALMIC at 19:40

## 2024-10-29 RX ADMIN — ERYTHROMYCIN: 5 OINTMENT OPHTHALMIC at 19:53

## 2024-10-29 ASSESSMENT — PAIN SCALES - GENERAL: PAINLEVEL_OUTOF10: 0

## 2024-10-29 ASSESSMENT — LIFESTYLE VARIABLES
HOW MANY STANDARD DRINKS CONTAINING ALCOHOL DO YOU HAVE ON A TYPICAL DAY: PATIENT DECLINED
HOW OFTEN DO YOU HAVE A DRINK CONTAINING ALCOHOL: PATIENT DECLINED

## 2024-10-29 ASSESSMENT — VISUAL ACUITY: OU: 1

## 2024-10-29 ASSESSMENT — PAIN - FUNCTIONAL ASSESSMENT: PAIN_FUNCTIONAL_ASSESSMENT: 0-10

## 2024-10-29 NOTE — ED NOTES
Pt was medicated per provider orders, tolerated well. Pt educated on self application of medication, understanding was verbalized. Pt was educated on and given discharge instructions, understanding was verbalized. Pt left ED with all belongings.

## 2024-10-29 NOTE — ED TRIAGE NOTES
Reports a piece of trash fell into eye. Has been flushing eye but stills feels like it is in the corner. Sclera reddened.

## 2024-10-29 NOTE — ED PROVIDER NOTES
[facility] EMERGENCY DEPT  EMERGENCY DEPARTMENT HISTORY AND PHYSICAL EXAM      Date: 10/29/2024  Patient Name: Trip Rodriguez  MRN: [id number]  YOB: 1970  Date of evaluation: 10/29/2024  Provider: Martha Vergara MD   Note Started: 7:16 PM EDT 10/29/24    HISTORY OF PRESENT ILLNESS     Chief Complaint   Patient presents with    Foreign Body in Eye       History Provided By: Patient    HPI: Trip Rodriguez is a 54 y.o. male     PAST MEDICAL HISTORY   Past Medical History:  Past Medical History:   Diagnosis Date    Kidney stone        Past Surgical History:  Past Surgical History:   Procedure Laterality Date    REPAIR ING HERNIA,5+Y/O,REDUCIBL Left 03/02/2018    Dr. Cuevas       Family History:  Family History   Problem Relation Age of Onset    Diabetes Mother        Social History:  Social History     Tobacco Use    Smoking status: Never    Smokeless tobacco: Never   Substance Use Topics    Alcohol use: No    Drug use: No       Allergies:  No Known Allergies    PCP: Laury Gutierres MD    Current Meds:   No current facility-administered medications for this encounter.     Current Outpatient Medications   Medication Sig Dispense Refill    ibuprofen (ADVIL;MOTRIN) 800 MG tablet Take 800 mg by mouth 3 times daily as needed         Social Determinants of Health:   Social Determinants of Health     Tobacco Use: Low Risk  (2/20/2024)    [facility], [facility]    Patient History     Smoking Tobacco Use: Never     Smokeless Tobacco Use: Never     Passive Exposure: Never   Alcohol Use: Not At Risk (7/24/2023)    AUDIT-C     Frequency of Alcohol Consumption: Monthly or less     Average Number of Drinks: 1 or 2     Frequency of Binge Drinking: Never   Financial Resource Strain: Not on file   Food Insecurity: Not on file   Transportation Needs: Not on file   Physical Activity: Not on file   Stress: Not on file   Social Connections: Not on file   Intimate Partner Violence: Not on file   Depression: Not  at risk (7/10/2019)    Received from [facility]  (prior to 6/17/2023), [facility]  (prior to 6/17/2023)    PHQ-2     PHQ-2 Score: 0   Housing Stability: Not on file   Interpersonal Safety: Not on file   Utilities: Not on file       PHYSICAL EXAM   Physical Exam  Vitals and nursing note reviewed.   Constitutional:       General: He is not in acute distress.     Appearance: He is normal weight. He is not ill-appearing, toxic-appearing or diaphoretic.   HENT:      Head: Normocephalic and atraumatic.      Nose: Nose normal.      Mouth/Throat:      Mouth: Mucous membranes are moist.   Eyes:      General: Lids are normal. Lids are everted, no foreign bodies appreciated. Vision grossly intact. Gaze aligned appropriately.         Right eye: No foreign body or discharge.         Left eye: No foreign body or discharge.      Extraocular Movements: Extraocular movements intact.      Conjunctiva/sclera:      Right eye: Right conjunctiva is not injected.      Left eye: Left conjunctiva is injected. No exudate or hemorrhage.     Pupils: Pupils are equal, round, and reactive to light.      Left eye: No corneal abrasion or fluorescein uptake.      Comments: No foreign body visualized, eyes flushed with eyewash saline solution   Musculoskeletal:      Cervical back: Normal range of motion and neck supple.   Lymphadenopathy:      Cervical: No cervical adenopathy.   Skin:     Capillary Refill: Capillary refill takes less than 2 seconds.   Neurological:      General: No focal deficit present.      Mental Status: He is alert and oriented to person, place, and time.   Psychiatric:         Mood and Affect: Mood normal.         Behavior: Behavior normal.           SCREENINGS              LAB, EKG AND DIAGNOSTIC RESULTS   Labs:  No results found for this or any previous visit (from the past 12 hour(s)).    EKG: Not Applicable    Radiologic Studies:  Non-plain film images such as CT, Ultrasound and MRI are

## (undated) DEVICE — SUTURE PERMA-HAND SZ 2-0 L24IN NONABSORBABLE BLK W/O NDL SA75H

## (undated) DEVICE — SUTURE VCRL SZ 2-0 L12X18IN ABSRB UD POLYGLACTIN 910 BRAID J911T

## (undated) DEVICE — 3M™ BAIR PAWS FLEX™ WARMING GOWN, STANDARD, 20 PER CASE 81003: Brand: BAIR PAWS™

## (undated) DEVICE — REM POLYHESIVE ADULT PATIENT RETURN ELECTRODE: Brand: VALLEYLAB

## (undated) DEVICE — KENDALL SCD EXPRESS SLEEVES, KNEE LENGTH, MEDIUM: Brand: KENDALL SCD

## (undated) DEVICE — SUTURE PDS II SZ 2-0 L27IN ABSRB VLT SH L26MM 1/2 CIR Z317H

## (undated) DEVICE — KIT CLN UP BON SECOURS MARYV

## (undated) DEVICE — SOLUTION IV 1000ML 0.9% SOD CHL

## (undated) DEVICE — SPONGE DISSECT PNUT SM 3/8IN -- 5/PK

## (undated) DEVICE — SUTURE VCRL SZ 3-0 L27IN ABSRB UD L24MM PS-1 3/8 CIR PRIM J936H

## (undated) DEVICE — TRAY CATH OD16FR SIL URIN M STATLOK STBL DEV SURSTP

## (undated) DEVICE — SUT PROL 2-0 30IN SH BLU --

## (undated) DEVICE — STERILE POLYISOPRENE POWDER-FREE SURGICAL GLOVES: Brand: PROTEXIS

## (undated) DEVICE — PENROSE TUBING RADIOPAQUE: Brand: ARGYLE

## (undated) DEVICE — SMOKE EVACUATION PENCIL: Brand: VALLEYLAB

## (undated) DEVICE — TRAY PREP DRY W/ PREM GLV 2 APPL 6 SPNG 2 UNDPD 1 OVERWRAP

## (undated) DEVICE — DRAPE TWL SURG 16X26IN BLU ORB04] ALLCARE INC]

## (undated) DEVICE — FLEX ADVANTAGE 3000CC: Brand: FLEX ADVANTAGE

## (undated) DEVICE — SOFT SILICONE HYDROCELLULAR SACRUM DRESSING WITH LOCK AWAY LAYER: Brand: ALLEVYN LIFE SACRUM (LARGE) PACK OF 10

## (undated) DEVICE — DERMABOND SKIN ADH 0.7ML -- DERMABOND ADVANCED 12/BX

## (undated) DEVICE — SUT PROL 2-0 30IN CT1 BLU --

## (undated) DEVICE — INTENDED FOR TISSUE SEPARATION, AND OTHER PROCEDURES THAT REQUIRE A SHARP SURGICAL BLADE TO PUNCTURE OR CUT.: Brand: BARD-PARKER SAFETY BLADES SIZE 15, STERILE

## (undated) DEVICE — SUTURE MCRYL SZ 4-0 L27IN ABSRB UD L24MM PS-1 3/8 CIR PRIM Y935H

## (undated) DEVICE — AIRLIFE™ NASAL OXYGEN CANNULA CURVED, NONFLARED TIP WITH 14 FOOT (4.3 M) CRUSH-RESISTANT TUBING, OVER-THE-EAR STYLE: Brand: AIRLIFE™

## (undated) DEVICE — PACK PROCEDURE SURG MAJ W/ BASIN LF